# Patient Record
Sex: FEMALE | Race: NATIVE HAWAIIAN OR OTHER PACIFIC ISLANDER | HISPANIC OR LATINO | Employment: UNEMPLOYED | URBAN - METROPOLITAN AREA
[De-identification: names, ages, dates, MRNs, and addresses within clinical notes are randomized per-mention and may not be internally consistent; named-entity substitution may affect disease eponyms.]

---

## 2024-07-15 ENCOUNTER — ANCILLARY PROCEDURE (OUTPATIENT)
Dept: ULTRASOUND IMAGING | Facility: CLINIC | Age: 29
End: 2024-07-15
Attending: ADVANCED PRACTICE MIDWIFE
Payer: MEDICAID

## 2024-07-15 DIAGNOSIS — N91.2 AMENORRHEA: ICD-10-CM

## 2024-07-15 PROCEDURE — 76801 OB US < 14 WKS SINGLE FETUS: CPT | Mod: TC | Performed by: RADIOLOGY

## 2024-07-22 ENCOUNTER — LAB REQUISITION (OUTPATIENT)
Dept: LAB | Facility: CLINIC | Age: 29
End: 2024-07-22
Payer: MEDICAID

## 2024-07-22 DIAGNOSIS — O09.891 SUPERVISION OF OTHER HIGH RISK PREGNANCIES, FIRST TRIMESTER: ICD-10-CM

## 2024-07-22 DIAGNOSIS — O09.891 SUPERVISION OF OTHER HIGH RISK PREGNANCIES, FIRST TRIMESTER: Primary | ICD-10-CM

## 2024-07-22 DIAGNOSIS — Z98.891 HISTORY OF VAGINAL DELIVERY FOLLOWING PREVIOUS CESAREAN DELIVERY: ICD-10-CM

## 2024-07-22 LAB
ABO/RH(D): NORMAL
ANTIBODY SCREEN: NEGATIVE
HBV SURFACE AB SERPL IA-ACNC: <3.5 M[IU]/ML
HBV SURFACE AB SERPL IA-ACNC: NONREACTIVE M[IU]/ML
HBV SURFACE AG SERPL QL IA: NONREACTIVE
HCV AB SERPL QL IA: NONREACTIVE
HIV 1+2 AB+HIV1 P24 AG SERPL QL IA: NONREACTIVE
SPECIMEN EXPIRATION DATE: NORMAL
VIT D+METAB SERPL-MCNC: 19 NG/ML (ref 20–50)

## 2024-07-22 PROCEDURE — 87389 HIV-1 AG W/HIV-1&-2 AB AG IA: CPT | Mod: ORL | Performed by: MIDWIFE

## 2024-07-22 PROCEDURE — 86706 HEP B SURFACE ANTIBODY: CPT | Mod: ORL | Performed by: MIDWIFE

## 2024-07-22 PROCEDURE — 86787 VARICELLA-ZOSTER ANTIBODY: CPT | Performed by: MIDWIFE

## 2024-07-22 PROCEDURE — 86780 TREPONEMA PALLIDUM: CPT | Performed by: MIDWIFE

## 2024-07-22 PROCEDURE — 82306 VITAMIN D 25 HYDROXY: CPT | Mod: ORL | Performed by: MIDWIFE

## 2024-07-22 PROCEDURE — 86900 BLOOD TYPING SEROLOGIC ABO: CPT | Mod: ORL | Performed by: MIDWIFE

## 2024-07-22 PROCEDURE — 86780 TREPONEMA PALLIDUM: CPT | Mod: ORL | Performed by: MIDWIFE

## 2024-07-22 PROCEDURE — 87086 URINE CULTURE/COLONY COUNT: CPT | Mod: ORL | Performed by: MIDWIFE

## 2024-07-22 PROCEDURE — 87086 URINE CULTURE/COLONY COUNT: CPT | Performed by: MIDWIFE

## 2024-07-22 PROCEDURE — 86901 BLOOD TYPING SEROLOGIC RH(D): CPT | Mod: ORL | Performed by: MIDWIFE

## 2024-07-22 PROCEDURE — 86762 RUBELLA ANTIBODY: CPT | Mod: ORL | Performed by: MIDWIFE

## 2024-07-22 PROCEDURE — 86787 VARICELLA-ZOSTER ANTIBODY: CPT | Mod: ORL | Performed by: MIDWIFE

## 2024-07-22 PROCEDURE — 86803 HEPATITIS C AB TEST: CPT | Mod: ORL | Performed by: MIDWIFE

## 2024-07-22 PROCEDURE — 87340 HEPATITIS B SURFACE AG IA: CPT | Mod: ORL | Performed by: MIDWIFE

## 2024-07-22 PROCEDURE — 86762 RUBELLA ANTIBODY: CPT | Performed by: MIDWIFE

## 2024-07-23 ENCOUNTER — TRANSCRIBE ORDERS (OUTPATIENT)
Dept: MATERNAL FETAL MEDICINE | Facility: CLINIC | Age: 29
End: 2024-07-23
Payer: MEDICAID

## 2024-07-23 DIAGNOSIS — O26.90 PREGNANCY RELATED CONDITION, ANTEPARTUM: Primary | ICD-10-CM

## 2024-07-23 LAB
RUBV IGG SERPL QL IA: 1.05 INDEX
RUBV IGG SERPL QL IA: POSITIVE
T PALLIDUM AB SER QL: NONREACTIVE
VZV IGG SER QL IA: 694.1 INDEX
VZV IGG SER QL IA: POSITIVE

## 2024-07-24 LAB — BACTERIA UR CULT: NO GROWTH

## 2024-08-07 ENCOUNTER — APPOINTMENT (OUTPATIENT)
Dept: INTERPRETER SERVICES | Facility: CLINIC | Age: 29
End: 2024-08-07
Payer: MEDICAID

## 2024-08-19 ENCOUNTER — LAB REQUISITION (OUTPATIENT)
Dept: LAB | Facility: CLINIC | Age: 29
End: 2024-08-19
Payer: MEDICAID

## 2024-08-19 DIAGNOSIS — O09.891 SUPERVISION OF OTHER HIGH RISK PREGNANCIES, FIRST TRIMESTER: ICD-10-CM

## 2024-08-19 PROCEDURE — 87491 CHLMYD TRACH DNA AMP PROBE: CPT | Mod: ORL | Performed by: ADVANCED PRACTICE MIDWIFE

## 2024-08-19 PROCEDURE — 87591 N.GONORRHOEAE DNA AMP PROB: CPT | Mod: ORL | Performed by: ADVANCED PRACTICE MIDWIFE

## 2024-08-20 LAB
C TRACH DNA SPEC QL NAA+PROBE: NEGATIVE
N GONORRHOEA DNA SPEC QL NAA+PROBE: NEGATIVE

## 2024-09-03 ENCOUNTER — PRE VISIT (OUTPATIENT)
Dept: MATERNAL FETAL MEDICINE | Facility: CLINIC | Age: 29
End: 2024-09-03
Payer: MEDICAID

## 2024-09-16 ENCOUNTER — HOSPITAL ENCOUNTER (OUTPATIENT)
Dept: ULTRASOUND IMAGING | Facility: CLINIC | Age: 29
Discharge: HOME | End: 2024-09-16
Attending: STUDENT IN AN ORGANIZED HEALTH CARE EDUCATION/TRAINING PROGRAM
Payer: MEDICAID

## 2024-09-16 ENCOUNTER — TRANSCRIBE ORDERS (OUTPATIENT)
Dept: MATERNAL FETAL MEDICINE | Facility: CLINIC | Age: 29
End: 2024-09-16
Payer: MEDICAID

## 2024-09-16 ENCOUNTER — OFFICE VISIT (OUTPATIENT)
Dept: MATERNAL FETAL MEDICINE | Facility: CLINIC | Age: 29
End: 2024-09-16
Attending: MIDWIFE
Payer: MEDICAID

## 2024-09-16 DIAGNOSIS — O35.9XX0 SUSPECTED FETAL ANOMALY, ANTEPARTUM, SINGLE OR UNSPECIFIED FETUS: Primary | ICD-10-CM

## 2024-09-16 DIAGNOSIS — O26.90 PREGNANCY RELATED CONDITION, ANTEPARTUM: ICD-10-CM

## 2024-09-16 DIAGNOSIS — O26.90 PREGNANCY RELATED CONDITION, ANTEPARTUM: Primary | ICD-10-CM

## 2024-09-16 PROCEDURE — 76805 OB US >/= 14 WKS SNGL FETUS: CPT

## 2024-09-16 PROCEDURE — 99202 OFFICE O/P NEW SF 15 MIN: CPT | Mod: 25 | Performed by: STUDENT IN AN ORGANIZED HEALTH CARE EDUCATION/TRAINING PROGRAM

## 2024-09-16 PROCEDURE — 76805 OB US >/= 14 WKS SNGL FETUS: CPT | Mod: 26 | Performed by: STUDENT IN AN ORGANIZED HEALTH CARE EDUCATION/TRAINING PROGRAM

## 2024-09-16 NOTE — PROGRESS NOTES
The patient was seen for an ultrasound in the [facility] today.  For a detailed report of the ultrasound examination, please see the ultrasound report which can be found under the imaging tab.    If you have questions regarding today's evaluation or if we can be of further service, please contact the [facility].    Diego Simms M.D.  Maternal Fetal-Medicine Specialist

## 2024-09-16 NOTE — NURSING NOTE
Roomed patient with assistance of ipad . Patient reports positive fetal movement, no pain, denies contractions, leaking of fluid, or bleeding.   Education provided to patient on ultrasound.  SBAR given to BECKIE MONIQUE, see their note in Epic.

## 2024-11-04 ENCOUNTER — LAB REQUISITION (OUTPATIENT)
Dept: LAB | Facility: CLINIC | Age: 29
End: 2024-11-04
Payer: MEDICAID

## 2024-11-04 DIAGNOSIS — O09.92 SUPERVISION OF HIGH RISK PREGNANCY, UNSPECIFIED, SECOND TRIMESTER: ICD-10-CM

## 2024-11-04 LAB
C TRACH DNA SPEC QL NAA+PROBE: NEGATIVE
N GONORRHOEA DNA SPEC QL NAA+PROBE: NEGATIVE
VIT D+METAB SERPL-MCNC: 35 NG/ML (ref 20–50)

## 2024-11-04 PROCEDURE — 87491 CHLMYD TRACH DNA AMP PROBE: CPT | Mod: ORL | Performed by: MIDWIFE

## 2024-11-04 PROCEDURE — 87591 N.GONORRHOEAE DNA AMP PROB: CPT | Mod: ORL | Performed by: MIDWIFE

## 2024-11-04 PROCEDURE — 87491 CHLMYD TRACH DNA AMP PROBE: CPT | Performed by: MIDWIFE

## 2024-11-04 PROCEDURE — 86592 SYPHILIS TEST NON-TREP QUAL: CPT | Performed by: MIDWIFE

## 2024-11-04 PROCEDURE — 87591 N.GONORRHOEAE DNA AMP PROB: CPT | Performed by: MIDWIFE

## 2024-11-04 PROCEDURE — 86592 SYPHILIS TEST NON-TREP QUAL: CPT | Mod: ORL | Performed by: MIDWIFE

## 2024-11-04 PROCEDURE — 82306 VITAMIN D 25 HYDROXY: CPT | Performed by: MIDWIFE

## 2024-11-05 LAB — RPR SER QL: NONREACTIVE

## 2025-01-03 ENCOUNTER — LAB REQUISITION (OUTPATIENT)
Dept: LAB | Facility: CLINIC | Age: 30
End: 2025-01-03
Payer: COMMERCIAL

## 2025-01-03 DIAGNOSIS — O09.891 SUPERVISION OF OTHER HIGH RISK PREGNANCIES, FIRST TRIMESTER: ICD-10-CM

## 2025-01-03 PROCEDURE — 87081 CULTURE SCREEN ONLY: CPT | Mod: ORL | Performed by: MIDWIFE

## 2025-01-07 LAB — BACTERIA SPEC CULT: NORMAL

## 2025-01-24 PROBLEM — Z98.891 HISTORY OF CESAREAN DELIVERY: Status: ACTIVE | Noted: 2025-01-24

## 2025-01-24 PROBLEM — O99.013 ANEMIA DURING PREGNANCY IN THIRD TRIMESTER: Status: ACTIVE | Noted: 2025-01-24

## 2025-01-24 PROBLEM — E55.9 VITAMIN D DEFICIENCY: Status: ACTIVE | Noted: 2024-07-24

## 2025-01-24 PROBLEM — O09.891 SUPERVISION OF OTHER HIGH RISK PREGNANCIES, FIRST TRIMESTER: Status: ACTIVE | Noted: 2024-07-22

## 2025-01-28 ENCOUNTER — ANESTHESIA EVENT (OUTPATIENT)
Dept: OBGYN | Facility: CLINIC | Age: 30
End: 2025-01-28
Payer: COMMERCIAL

## 2025-01-28 ENCOUNTER — APPOINTMENT (OUTPATIENT)
Dept: INTERPRETER SERVICES | Facility: CLINIC | Age: 30
End: 2025-01-28
Payer: COMMERCIAL

## 2025-01-28 ENCOUNTER — OFFICE VISIT (OUTPATIENT)
Dept: INTERPRETER SERVICES | Facility: CLINIC | Age: 30
End: 2025-01-28
Payer: COMMERCIAL

## 2025-01-28 ENCOUNTER — ANESTHESIA (OUTPATIENT)
Dept: OBGYN | Facility: CLINIC | Age: 30
End: 2025-01-28
Payer: COMMERCIAL

## 2025-01-28 ENCOUNTER — HOSPITAL ENCOUNTER (INPATIENT)
Facility: CLINIC | Age: 30
End: 2025-01-28
Attending: ADVANCED PRACTICE MIDWIFE | Admitting: ADVANCED PRACTICE MIDWIFE
Payer: COMMERCIAL

## 2025-01-28 ENCOUNTER — VIRTUAL VISIT (OUTPATIENT)
Dept: INTERPRETER SERVICES | Facility: CLINIC | Age: 30
End: 2025-01-28
Payer: COMMERCIAL

## 2025-01-28 DIAGNOSIS — Z98.891 S/P CESAREAN SECTION: ICD-10-CM

## 2025-01-28 PROBLEM — O13.3 GESTATIONAL HYPERTENSION, THIRD TRIMESTER: Status: ACTIVE | Noted: 2025-01-28

## 2025-01-28 PROBLEM — D50.8 IRON DEFICIENCY ANEMIA SECONDARY TO INADEQUATE DIETARY IRON INTAKE: Status: ACTIVE | Noted: 2025-01-09

## 2025-01-28 PROBLEM — O09.90 HIGH-RISK PREGNANCY: Status: ACTIVE | Noted: 2024-07-22

## 2025-01-28 PROBLEM — Z34.90 ENCOUNTER FOR INDUCTION OF LABOR: Status: ACTIVE | Noted: 2025-01-28

## 2025-01-28 LAB
ABO + RH BLD: NORMAL
ALBUMIN MFR UR ELPH: 55.5 MG/DL
ALBUMIN SERPL BCG-MCNC: 3.3 G/DL (ref 3.5–5.2)
ALP SERPL-CCNC: 296 U/L (ref 40–150)
ALT SERPL W P-5'-P-CCNC: 26 U/L (ref 0–50)
ANION GAP SERPL CALCULATED.3IONS-SCNC: 11 MMOL/L (ref 7–15)
AST SERPL W P-5'-P-CCNC: 36 U/L (ref 0–45)
BILIRUB SERPL-MCNC: 0.6 MG/DL
BLD GP AB SCN SERPL QL: NEGATIVE
BUN SERPL-MCNC: 9.2 MG/DL (ref 6–20)
CALCIUM SERPL-MCNC: 8.7 MG/DL (ref 8.8–10.4)
CHLORIDE SERPL-SCNC: 103 MMOL/L (ref 98–107)
CREAT SERPL-MCNC: 0.56 MG/DL (ref 0.51–0.95)
CREAT UR-MCNC: 231.5 MG/DL
EGFRCR SERPLBLD CKD-EPI 2021: >90 ML/MIN/1.73M2
ERYTHROCYTE [DISTWIDTH] IN BLOOD BY AUTOMATED COUNT: 15.2 % (ref 10–15)
GLUCOSE SERPL-MCNC: 85 MG/DL (ref 70–99)
HCO3 SERPL-SCNC: 20 MMOL/L (ref 22–29)
HCT VFR BLD AUTO: 30.9 % (ref 35–47)
HGB BLD-MCNC: 10.8 G/DL (ref 11.7–15.7)
MCH RBC QN AUTO: 30.4 PG (ref 26.5–33)
MCHC RBC AUTO-ENTMCNC: 35 G/DL (ref 31.5–36.5)
MCV RBC AUTO: 87 FL (ref 78–100)
PLATELET # BLD AUTO: 158 10E3/UL (ref 150–450)
POTASSIUM SERPL-SCNC: 4 MMOL/L (ref 3.4–5.3)
PROT SERPL-MCNC: 6.1 G/DL (ref 6.4–8.3)
PROT/CREAT 24H UR: 0.24 MG/MG CR (ref 0–0.2)
RBC # BLD AUTO: 3.55 10E6/UL (ref 3.8–5.2)
SODIUM SERPL-SCNC: 134 MMOL/L (ref 135–145)
SPECIMEN EXP DATE BLD: NORMAL
T PALLIDUM AB SER QL: NONREACTIVE
WBC # BLD AUTO: 4.2 10E3/UL (ref 4–11)

## 2025-01-28 PROCEDURE — 80053 COMPREHEN METABOLIC PANEL: CPT | Performed by: ADVANCED PRACTICE MIDWIFE

## 2025-01-28 PROCEDURE — 258N000003 HC RX IP 258 OP 636: Performed by: ADVANCED PRACTICE MIDWIFE

## 2025-01-28 PROCEDURE — 86923 COMPATIBILITY TEST ELECTRIC: CPT

## 2025-01-28 PROCEDURE — 250N000009 HC RX 250: Performed by: ADVANCED PRACTICE MIDWIFE

## 2025-01-28 PROCEDURE — 00HU33Z INSERTION OF INFUSION DEVICE INTO SPINAL CANAL, PERCUTANEOUS APPROACH: ICD-10-PCS | Performed by: ANESTHESIOLOGY

## 2025-01-28 PROCEDURE — T1013 SIGN LANG/ORAL INTERPRETER: HCPCS | Mod: GT,TEL,95

## 2025-01-28 PROCEDURE — 120N000002 HC R&B MED SURG/OB UMMC

## 2025-01-28 PROCEDURE — 85027 COMPLETE CBC AUTOMATED: CPT | Performed by: ADVANCED PRACTICE MIDWIFE

## 2025-01-28 PROCEDURE — 3E0R3BZ INTRODUCTION OF ANESTHETIC AGENT INTO SPINAL CANAL, PERCUTANEOUS APPROACH: ICD-10-PCS | Performed by: ANESTHESIOLOGY

## 2025-01-28 PROCEDURE — 86850 RBC ANTIBODY SCREEN: CPT | Performed by: ADVANCED PRACTICE MIDWIFE

## 2025-01-28 PROCEDURE — 86780 TREPONEMA PALLIDUM: CPT | Performed by: ADVANCED PRACTICE MIDWIFE

## 2025-01-28 PROCEDURE — 86900 BLOOD TYPING SEROLOGIC ABO: CPT | Performed by: ADVANCED PRACTICE MIDWIFE

## 2025-01-28 PROCEDURE — T1013 SIGN LANG/ORAL INTERPRETER: HCPCS

## 2025-01-28 PROCEDURE — 84156 ASSAY OF PROTEIN URINE: CPT | Performed by: ADVANCED PRACTICE MIDWIFE

## 2025-01-28 RX ORDER — PROCHLORPERAZINE MALEATE 10 MG
10 TABLET ORAL EVERY 6 HOURS PRN
Status: DISCONTINUED | OUTPATIENT
Start: 2025-01-28 | End: 2025-01-29 | Stop reason: HOSPADM

## 2025-01-28 RX ORDER — ACETAMINOPHEN 325 MG/1
650 TABLET ORAL EVERY 4 HOURS PRN
Status: DISCONTINUED | OUTPATIENT
Start: 2025-01-28 | End: 2025-01-29 | Stop reason: HOSPADM

## 2025-01-28 RX ORDER — CITRIC ACID/SODIUM CITRATE 334-500MG
30 SOLUTION, ORAL ORAL
Status: DISCONTINUED | OUTPATIENT
Start: 2025-01-28 | End: 2025-01-29 | Stop reason: HOSPADM

## 2025-01-28 RX ORDER — KETOROLAC TROMETHAMINE 30 MG/ML
30 INJECTION, SOLUTION INTRAMUSCULAR; INTRAVENOUS
Status: DISCONTINUED | OUTPATIENT
Start: 2025-01-28 | End: 2025-01-29

## 2025-01-28 RX ORDER — FENTANYL CITRATE 50 UG/ML
100 INJECTION, SOLUTION INTRAMUSCULAR; INTRAVENOUS
Status: DISCONTINUED | OUTPATIENT
Start: 2025-01-28 | End: 2025-01-29 | Stop reason: HOSPADM

## 2025-01-28 RX ORDER — LOPERAMIDE HYDROCHLORIDE 2 MG/1
2 CAPSULE ORAL
Status: DISCONTINUED | OUTPATIENT
Start: 2025-01-28 | End: 2025-01-29 | Stop reason: HOSPADM

## 2025-01-28 RX ORDER — SODIUM CHLORIDE, SODIUM LACTATE, POTASSIUM CHLORIDE, CALCIUM CHLORIDE 600; 310; 30; 20 MG/100ML; MG/100ML; MG/100ML; MG/100ML
10-125 INJECTION, SOLUTION INTRAVENOUS CONTINUOUS
Status: DISCONTINUED | OUTPATIENT
Start: 2025-01-28 | End: 2025-01-29

## 2025-01-28 RX ORDER — HYDRALAZINE HYDROCHLORIDE 20 MG/ML
10 INJECTION INTRAMUSCULAR; INTRAVENOUS
Status: DISCONTINUED | OUTPATIENT
Start: 2025-01-28 | End: 2025-01-29

## 2025-01-28 RX ORDER — VITAMIN A, VITAMIN C, VITAMIN D-3, VITAMIN E, VITAMIN B-1, VITAMIN B-2, NIACIN, VITAMIN B-6, CALCIUM, IRON, ZINC, COPPER 4000; 120; 400; 22; 1.84; 3; 20; 10; 1; 12; 200; 27; 25; 2 [IU]/1; MG/1; [IU]/1; MG/1; MG/1; MG/1; MG/1; MG/1; MG/1; UG/1; MG/1; MG/1; MG/1; MG/1
1 TABLET ORAL DAILY
Status: ON HOLD | COMMUNITY

## 2025-01-28 RX ORDER — LIDOCAINE 40 MG/G
CREAM TOPICAL
Status: DISCONTINUED | OUTPATIENT
Start: 2025-01-28 | End: 2025-01-29

## 2025-01-28 RX ORDER — LIDOCAINE 40 MG/G
CREAM TOPICAL
Status: DISCONTINUED | OUTPATIENT
Start: 2025-01-28 | End: 2025-01-29 | Stop reason: HOSPADM

## 2025-01-28 RX ORDER — LABETALOL HYDROCHLORIDE 5 MG/ML
20-80 INJECTION, SOLUTION INTRAVENOUS EVERY 10 MIN PRN
Status: DISCONTINUED | OUTPATIENT
Start: 2025-01-28 | End: 2025-01-29

## 2025-01-28 RX ORDER — METOCLOPRAMIDE 10 MG/1
10 TABLET ORAL EVERY 6 HOURS PRN
Status: DISCONTINUED | OUTPATIENT
Start: 2025-01-28 | End: 2025-01-29 | Stop reason: HOSPADM

## 2025-01-28 RX ORDER — HYDRALAZINE HYDROCHLORIDE 20 MG/ML
10 INJECTION INTRAMUSCULAR; INTRAVENOUS
Status: ACTIVE | OUTPATIENT
Start: 2025-01-28

## 2025-01-28 RX ORDER — NALOXONE HYDROCHLORIDE 0.4 MG/ML
0.2 INJECTION, SOLUTION INTRAMUSCULAR; INTRAVENOUS; SUBCUTANEOUS
Status: DISCONTINUED | OUTPATIENT
Start: 2025-01-28 | End: 2025-01-29 | Stop reason: HOSPADM

## 2025-01-28 RX ORDER — OXYTOCIN/0.9 % SODIUM CHLORIDE 30/500 ML
100-340 PLASTIC BAG, INJECTION (ML) INTRAVENOUS CONTINUOUS PRN
Status: DISCONTINUED | OUTPATIENT
Start: 2025-01-28 | End: 2025-01-29

## 2025-01-28 RX ORDER — IBUPROFEN 800 MG/1
800 TABLET, FILM COATED ORAL
Status: DISCONTINUED | OUTPATIENT
Start: 2025-01-28 | End: 2025-01-29

## 2025-01-28 RX ORDER — OXYTOCIN 10 [USP'U]/ML
10 INJECTION, SOLUTION INTRAMUSCULAR; INTRAVENOUS
Status: DISCONTINUED | OUTPATIENT
Start: 2025-01-28 | End: 2025-01-29

## 2025-01-28 RX ORDER — SODIUM CHLORIDE, SODIUM LACTATE, POTASSIUM CHLORIDE, CALCIUM CHLORIDE 600; 310; 30; 20 MG/100ML; MG/100ML; MG/100ML; MG/100ML
10-125 INJECTION, SOLUTION INTRAVENOUS CONTINUOUS
Status: ACTIVE | OUTPATIENT
Start: 2025-01-28

## 2025-01-28 RX ORDER — MISOPROSTOL 200 UG/1
800 TABLET ORAL
Status: DISCONTINUED | OUTPATIENT
Start: 2025-01-28 | End: 2025-01-29 | Stop reason: HOSPADM

## 2025-01-28 RX ORDER — METOCLOPRAMIDE HYDROCHLORIDE 5 MG/ML
10 INJECTION INTRAMUSCULAR; INTRAVENOUS EVERY 6 HOURS PRN
Status: DISCONTINUED | OUTPATIENT
Start: 2025-01-28 | End: 2025-01-29 | Stop reason: HOSPADM

## 2025-01-28 RX ORDER — LOPERAMIDE HYDROCHLORIDE 2 MG/1
4 CAPSULE ORAL
Status: DISCONTINUED | OUTPATIENT
Start: 2025-01-28 | End: 2025-01-29 | Stop reason: HOSPADM

## 2025-01-28 RX ORDER — ONDANSETRON 2 MG/ML
4 INJECTION INTRAMUSCULAR; INTRAVENOUS EVERY 6 HOURS PRN
Status: DISCONTINUED | OUTPATIENT
Start: 2025-01-28 | End: 2025-01-29 | Stop reason: HOSPADM

## 2025-01-28 RX ORDER — MORPHINE SULFATE 10 MG/ML
10 INJECTION, SOLUTION INTRAMUSCULAR; INTRAVENOUS
Status: DISCONTINUED | OUTPATIENT
Start: 2025-01-28 | End: 2025-01-29 | Stop reason: HOSPADM

## 2025-01-28 RX ORDER — SODIUM CHLORIDE, SODIUM LACTATE, POTASSIUM CHLORIDE, CALCIUM CHLORIDE 600; 310; 30; 20 MG/100ML; MG/100ML; MG/100ML; MG/100ML
INJECTION, SOLUTION INTRAVENOUS CONTINUOUS PRN
Status: DISCONTINUED | OUTPATIENT
Start: 2025-01-28 | End: 2025-01-29 | Stop reason: HOSPADM

## 2025-01-28 RX ORDER — OXYTOCIN 10 [USP'U]/ML
10 INJECTION, SOLUTION INTRAMUSCULAR; INTRAVENOUS
Status: DISCONTINUED | OUTPATIENT
Start: 2025-01-28 | End: 2025-01-29 | Stop reason: HOSPADM

## 2025-01-28 RX ORDER — TRANEXAMIC ACID 10 MG/ML
1 INJECTION, SOLUTION INTRAVENOUS EVERY 30 MIN PRN
Status: DISCONTINUED | OUTPATIENT
Start: 2025-01-28 | End: 2025-01-29 | Stop reason: HOSPADM

## 2025-01-28 RX ORDER — ONDANSETRON 4 MG/1
4 TABLET, ORALLY DISINTEGRATING ORAL EVERY 6 HOURS PRN
Status: DISCONTINUED | OUTPATIENT
Start: 2025-01-28 | End: 2025-01-29 | Stop reason: HOSPADM

## 2025-01-28 RX ORDER — CARBOPROST TROMETHAMINE 250 UG/ML
250 INJECTION, SOLUTION INTRAMUSCULAR
Status: DISCONTINUED | OUTPATIENT
Start: 2025-01-28 | End: 2025-01-29 | Stop reason: HOSPADM

## 2025-01-28 RX ORDER — NALOXONE HYDROCHLORIDE 0.4 MG/ML
0.4 INJECTION, SOLUTION INTRAMUSCULAR; INTRAVENOUS; SUBCUTANEOUS
Status: DISCONTINUED | OUTPATIENT
Start: 2025-01-28 | End: 2025-01-29 | Stop reason: HOSPADM

## 2025-01-28 RX ORDER — MISOPROSTOL 200 UG/1
400 TABLET ORAL
Status: DISCONTINUED | OUTPATIENT
Start: 2025-01-28 | End: 2025-01-29 | Stop reason: HOSPADM

## 2025-01-28 RX ORDER — LABETALOL HYDROCHLORIDE 5 MG/ML
20-80 INJECTION, SOLUTION INTRAVENOUS EVERY 10 MIN PRN
Status: ACTIVE | OUTPATIENT
Start: 2025-01-28

## 2025-01-28 RX ORDER — METHYLERGONOVINE MALEATE 0.2 MG/ML
200 INJECTION INTRAVENOUS
Status: DISCONTINUED | OUTPATIENT
Start: 2025-01-28 | End: 2025-01-29 | Stop reason: HOSPADM

## 2025-01-28 RX ORDER — FERROUS GLUCONATE 324(38)MG
324 TABLET ORAL
Status: ON HOLD | COMMUNITY

## 2025-01-28 RX ORDER — MISOPROSTOL 100 UG/1
25 TABLET ORAL EVERY 4 HOURS PRN
Status: DISCONTINUED | OUTPATIENT
Start: 2025-01-28 | End: 2025-01-28

## 2025-01-28 RX ORDER — OXYTOCIN/0.9 % SODIUM CHLORIDE 30/500 ML
1-24 PLASTIC BAG, INJECTION (ML) INTRAVENOUS CONTINUOUS
Status: DISCONTINUED | OUTPATIENT
Start: 2025-01-28 | End: 2025-01-29 | Stop reason: HOSPADM

## 2025-01-28 RX ORDER — OXYTOCIN/0.9 % SODIUM CHLORIDE 30/500 ML
340 PLASTIC BAG, INJECTION (ML) INTRAVENOUS CONTINUOUS PRN
Status: DISCONTINUED | OUTPATIENT
Start: 2025-01-28 | End: 2025-01-29 | Stop reason: HOSPADM

## 2025-01-28 RX ADMIN — SODIUM CHLORIDE, POTASSIUM CHLORIDE, SODIUM LACTATE AND CALCIUM CHLORIDE 50 ML/HR: 600; 310; 30; 20 INJECTION, SOLUTION INTRAVENOUS at 14:32

## 2025-01-28 RX ADMIN — Medication 2 MILLI-UNITS/MIN: at 14:32

## 2025-01-28 ASSESSMENT — ACTIVITIES OF DAILY LIVING (ADL)
ADLS_ACUITY_SCORE: 20
ADLS_ACUITY_SCORE: 15

## 2025-01-28 NOTE — H&P
"ADMIT NOTE  =================  41w0d    Deisi Carl is a 29 year old female with an Patient's last menstrual period was 2024. and Estimated Date of Delivery: 2025 is [facility] on 2025 at 9:41 AM with for induction of labor.  Indication: prolonged pregnancy.     HPI  ================  Deisi has received her care at the [facility] clinic. She has a history of CS in [facility] in . She was told the indication was that they saw a nuchal cord on US and recommended a CS. She did not have an opportunity to labor.   She opted for an IOL when she reached 41 weeks  Pregnancy has been significant for anemia with a recent Hgb of 10.0, Hx of CS, vitamin D deficiency, Hep B NI s/p 3 doses    Contractions- none  Fetal movement- active  ROM- no   Vaginal bleeding- none  GBS- negative  FOB- is involved, Marvin  Other labor support- NA    Weight gain- 158 - 123 lbs, Total weight gain- 35 lbs  Height- 5'0\"  BMI- 23  First prenatal visit at 13 weeks, Total visits- 12    PROBLEM LIST  =================  Patient Active Problem List    Diagnosis Date Noted    Encounter for induction of labor 2025     Priority: Medium    History of  delivery planning TOLAC 2025     Priority: Medium    Anemia during pregnancy in third trimester 2025     Priority: Medium     1/3/25 Hgb 10.0      Iron deficiency anemia secondary to inadequate dietary iron intake 2025     Priority: Medium    Vitamin D deficiency 2024     Priority: Medium    High-risk pregnancy 2024     Priority: Medium     [facility] CNM    Partner's name:     [x]Entered on [facility] prenatal list    [x]Applied for insurance    [x]NOB folder    [x]Dating    [x]Patient declines 1st tri genetic screening    [x]Fetal anatomy US ordered    [x]recommended LD ASA after 12 wks for PRE-E risk    [x]No increased risk for GDM  pending A1C     [x]No need for utox in labor    []COVID vaccine completed    []Pap plan " postpartum         12-23wks________________________    [x]Rubella immune    []Hep B not immune     [x]Varicella immune    [x]declines AFP/QS    []FLU shot         24-28wk_________________________    []EOB folder    []Labor plans:    []:    []Car seat:    []Breast pump:    []Infant feeding plan:     []Infant pediatrician:    []PP Contraception plan: If tubal,consent date:    []TDAP     []Rhogam if needed, date:         29-35 wk________________________    []TOLAC consent done    []Water birth interest    []GCT    []RSV         36-37 wks______________________     []GBS     []OTC PP meds sent    []PP recovery plans:         38-42 wks______________________    []IOL reason/plans    []Postdates BPP    [facility]  Partner's name: Marvin  [x]Entered on [facility] prenatal list  [x]Applied for insurance  [x]NOB folder  [x]Dating  [x]Patient declines 1st tri genetic screening  [x]Fetal anatomy US ordered  [x]recommended LD ASA after 12 wks for PRE-E risk  [x]No increased risk for GDM  pending A1C   [x]No need for utox in labor  []COVID vaccine completed  []Pap plan postpartum    12-23wks________________________  [x]Rubella immune  [x]Hep B not immune  #1 8/19  #2 9/16  #3 1/3 done  [x]Varicella immune  [x]declines AFP/QS  [x]FLU shot declines    24-28wk_________________________  [x]EOB folder  [x]Labor plans: TOLAC  [x]: not interested   [x]Car seat: Has  [x]Breast pump: Has  [x]Infant feeding plan: breast/bottle  [x]Infant pediatrician: BRIELLE  [x]PP Contraception plan: Nexplanon  [x]TDAP   NA- Rhogam if needed, date:    29-35 wk________________________  [x]TOLAC consent done  [x]Water birth interest NA  [x]GCT  []RSV    36-37 wks______________________   [x]GBS negative  [x]OTC PP meds sent  [x]PP recovery plans: PP recovery plans: will be able to be home with the baby for 3 months, Marvin will be home 2-3 weeks. Marvin has a brother here and Deisi has a sister her who will be supportive as well    38-42  wks______________________  [x]IOL reason/plans: prolonged pregnancy 25 at 7:30  []Postdates BPP         HISTORIES  ============  No Known Allergies  History reviewed. No pertinent past medical history.  Past Surgical History:   Procedure Laterality Date    GYN SURGERY     .  History reviewed. No pertinent family history.  Social History     Tobacco Use    Smoking status: Never    Smokeless tobacco: Never   Substance Use Topics    Alcohol use: Never     OB History    Para Term  AB Living   3 2 2 0 0 2   SAB IAB Ectopic Multiple Live Births   0 0 0 0 2      # Outcome Date GA Lbr Rj/2nd Weight Sex Type Anes PTL Lv   3 Current            2 Term 14 41w0d  3.175 kg (7 lb) M CS-LTranv   REAGAN      Birth Comments: PLTCS for fetal distress      Name: Harsh   1 Term         REAGAN      Obstetric Comments   Previous C/S        LABS:   ===========  Prenatal Labs:  Rhogam not indicated   Lab Results   Component Value Date    AS Negative 2024    HEPBANG Nonreactive 2024    HGB 10.8 (L) 2025     Rubella immune  GBS neg  Other labs:  Results for orders placed or performed during the hospital encounter of 25 (from the past 24 hours)   CBC with platelets   Result Value Ref Range    WBC Count 4.2 4.0 - 11.0 10e3/uL    RBC Count 3.55 (L) 3.80 - 5.20 10e6/uL    Hemoglobin 10.8 (L) 11.7 - 15.7 g/dL    Hematocrit 30.9 (L) 35.0 - 47.0 %    MCV 87 78 - 100 fL    MCH 30.4 26.5 - 33.0 pg    MCHC 35.0 31.5 - 36.5 g/dL    RDW 15.2 (H) 10.0 - 15.0 %    Platelet Count 158 150 - 450 10e3/uL   ABO/Rh type and screen    Narrative    The following orders were created for panel order ABO/Rh type and screen.  Procedure                               Abnormality         Status                     ---------                               -----------         ------                     Adult Type and Screen[[id number]]                            Preliminary result           Please view results for these tests on  "the individual orders.   Adult Type and Screen   Result Value Ref Range    SPECIMEN EXPIRATION DATE [id number]        ROS  =========  Pt denies significant respiratory, cardiovacular, GI, or muscular/skeletalcomplaints.    See RN data base ROS.       PHYSICAL EXAM:  ===============  /65 (Patient Position: Semi-Lemos's, Cuff Size: Adult Regular)   Temp 98.6  F (37  C) (Oral)   Resp 16   Ht 1.55 m (5' 1.02\")   Wt 71.7 kg (158 lb)   LMP 2024   BMI 29.83 kg/m    General appearance: comfortable  GENERAL APPEARANCE: healthy, alert and no distress  RESP: lungs clear to auscultation - no rales, rhonchi or wheezes  CV: regular rates and rhythm, normal S1 S2, no S3 or S4 and no murmur,and no varicosities  ABDOMEN:  soft, nontender, no epigastric pain  SKIN: no suspicious lesions or rashes  NEURO: Denies headache, blurred vision, other vision changes  PSYCH: mentation appears normal. and affect normal/bright  Legs: Reflexes normal bilaterally     Abdomen: gravid, vertex fetus per Leopold's, non-tender between contractions.   Cephalic presentation confirmed by BSUS - LOT  EFW-  7.5 lbs.   CONTACTIONS: none  FETAL HEART TONES: continuous EFM- baseline 145 with moderate variability and positive accelerations. No decelerations.  PELVIC EXAM: 1/ 30%/ Posterior/ average/ -3   YANEZ SCORE: 3  COOK placed internal balloon infused to 80mL  BLOODY SHOW: no   ROM:no  FLUID: none  ROMPlus: not done    ASSESSMENT:  ==============  IUP @ 41w0d admitted for induction of labor.  Indication: prolonged pregnancy   Fetal Heart Rate - category one  GBS- negative    Patient Active Problem List   Diagnosis    High-risk pregnancy    Vitamin D deficiency    History of  delivery planning TOLAC    Anemia during pregnancy in third trimester    Encounter for induction of labor    Iron deficiency anemia secondary to inadequate dietary iron intake        PLAN:  ===========  -Reviewed options for IOL, Deisi accepts COOK " catheter placement after review of R/B/A  -Plan to start pitocin once the her cervix is 4cm or with expulsion of COOK  -Admit - see IP orders  -Pain medication options of nitrous oxide, fentanyl IV and epidural anesthesia reviewed with pt. Pt is interested in unmedicated birth at this point may consider  cervical ripening with Cook catheter risks and benefits reviewed with pt. Agreeable to plan.  -Dr. Vargas aware of pt's desire to TOLAC    Medically Ready for Discharge: Anticipated in 2-4 Days      Irma Yoon CNM

## 2025-01-28 NOTE — PROVIDER NOTIFICATION
25 0840   Provider Notification   Provider Name/Title Irma Colmenares   Method of Notification Electronic Page   Request Evaluate in Person   Notification Reason Patient Arrived;Status Update     Provider notified via web based paging. Patient has arrived for IOL for TOLAC.  41 weeks. Denies vaginal bleeding or leakage of fluid. Denies pain or contractions. She had one mild range BP. Has +2 pitting edema to bilateral feet. Denies other s/s of preeclampsia. Baby is on continuous fetal monitoring.

## 2025-01-28 NOTE — PROGRESS NOTES
SVE at 1408 was 3/80/-3/posterior/medium with titus score 6. Pitocin started at 2 units at 1442. COOK balloon still intact.

## 2025-01-28 NOTE — ANESTHESIA PREPROCEDURE EVALUATION
"Anesthesia Pre-Procedure Evaluation    Patient: Deiis Jeter [place]   MRN: [id number] : 1995        Procedure :           History reviewed. No pertinent past medical history.   Past Surgical History:   Procedure Laterality Date     GYN SURGERY        No Known Allergies   Social History     Tobacco Use     Smoking status: Never     Smokeless tobacco: Never   Substance Use Topics     Alcohol use: Never      Wt Readings from Last 1 Encounters:   25 71.7 kg (158 lb)        Anesthesia Evaluation   Pt has had prior anesthetic. Type: Regional.    No history of anesthetic complications       ROS/MED HX  ENT/Pulmonary:  - neg pulmonary ROS     Neurologic:  - neg neurologic ROS     Cardiovascular:     (+)  hypertension- -   -  - -                                      METS/Exercise Tolerance:     Hematologic:     (+)      anemia,          Musculoskeletal:  - neg musculoskeletal ROS     GI/Hepatic:     (+) GERD, Other,                  Renal/Genitourinary:       Endo:  - neg endo ROS     Psychiatric/Substance Use:  - neg psychiatric ROS     Infectious Disease:       Malignancy:  - neg malignancy ROS     Other:      (+)  , ,previous , TOLAC candidate         Physical Exam    Airway        Mallampati: III   TM distance: > 3 FB   Neck ROM: full   Mouth opening: > 3 cm    Respiratory Devices and Support         Dental       (+) Modest Abnormalities - crowns, retainers, 1 or 2 missing teeth      Cardiovascular   cardiovascular exam normal       Rhythm and rate: regular and normal     Pulmonary   pulmonary exam normal        breath sounds clear to auscultation         OUTSIDE LABS:  CBC: No results found for: \"WBC\", \"HGB\", \"HCT\", \"PLT\"  BMP: No results found for: \"NA\", \"POTASSIUM\", \"CHLORIDE\", \"CO2\", \"BUN\", \"CR\", \"GLC\"  COAGS: No results found for: \"PTT\", \"INR\", \"FIBR\"  POC: No results found for: \"BGM\", \"HCG\", \"HCGS\"  HEPATIC: No results found for: \"ALBUMIN\", \"PROTTOTAL\", \"ALT\", \"AST\", \"GGT\", \"ALKPHOS\", " "\"BILITOTAL\", \"BILIDIRECT\", \"ALLAN\"  OTHER: No results found for: \"PH\", \"LACT\", \"A1C\", \"SUDHEER\", \"PHOS\", \"MAG\", \"LIPASE\", \"AMYLASE\", \"TSH\", \"T4\", \"T3\", \"CRP\", \"SED\"    Anesthesia Plan    ASA Status:  2    NPO Status:  ELEVATED Aspiration Risk/Unknown    Anesthesia Type: Epidural.              Consents    Anesthesia Plan(s) and associated risks, benefits, and realistic alternatives discussed. Questions answered and patient/representative(s) expressed understanding.     - Discussed:     - Discussed with:  Patient            Postoperative Care    Pain management: Multi-modal analgesia.        Comments:           neg OB ROS.      Shantal Johnson MD    I have reviewed the pertinent notes and labs in the chart from the past 30 days and (re)examined the patient.  Any updates or changes from those notes are reflected in this note.    Clinically Significant Risk Factors Present on Admission                                          "

## 2025-01-28 NOTE — PROGRESS NOTES
Data: Patient admitted to room 462 at 0730. Patient is a . Prenatal record reviewed.   OB History    Para Term  AB Living   3 2 2 0 0 2   SAB IAB Ectopic Multiple Live Births   0 0 0 0 2      # Outcome Date GA Lbr Rj/2nd Weight Sex Type Anes PTL Lv   3 Current            2 Term 14 41w0d  3.175 kg (7 lb) M CS-LTranv   REAGAN      Birth Comments: PLTCS for fetal distress      Name: Harsh Francis Term         REAGAN      Obstetric Comments   Previous C/S   .  Medical History: History reviewed. No pertinent past medical history..  Gestational age 41w0d. Vital signs per doc flowsheet. Fetal movement present. Patient reports Induction Of Labor   as reason for admission. Support persons  Marvin present.  Action: In-person  present. Verbal consent for EFM, external fetal monitors applied. Admission assessment completed. Patient and support persons educated on labor process. Patient instructed to report change in fetal movement, contractions, vaginal leaking of fluid or bleeding, abdominal pain, or any concerns related to the pregnancy to her nurse/physician. Patient oriented to room, call light in reach.   Response: DOUG Colmenares informed of patient arrival and patient status. SVE /-3 at 0928. Plan per provider is COOK catheter with single balloon. Patient verbalized understanding of education and verbalized agreement with plan. COOK balloon placed at 0930 with 80 ml uterine inflation. Patient tolerated the procedure well. Patient coping with labor via support person.

## 2025-01-28 NOTE — PROGRESS NOTES
Dylan had anesthesia consultation. She is informed and educated about the pain management options during labor. She consented for epidural, in case she decides to choose it during active stage of labor. In-person  Marcella Vallecillo with ID number [id number] was present during consultation and signing of consent.

## 2025-01-28 NOTE — PROGRESS NOTES
"Labor Progress Note     SUBJECTIVE:  ==============  Deisi Jeter [place]  Estimated Date of Delivery: 2025  Deisi is sitting up in throne position. She has had an opportunity to walk and sit on the labor ball. Accepts an SVE at this time.   Discussed diagnosis of gestational hypertension now that she has had two mild range blood pressures >4 hours apart    General appearance: comfortable  Support: Marvin      OBJECTIVE:  ==============  VITALS  Blood pressure (!) 141/79, temperature 97.6  F (36.4  C), temperature source Oral, resp. rate 16, height 1.55 m (5' 1.02\"), weight 71.7 kg (158 lb), last menstrual period 2024.  Patient Vitals for the past 24 hrs:   BP Temp Temp src Resp Height Weight   25 1250 (!) 141/79 97.6  F (36.4  C) Oral 16 -- --   25 0830 120/65 -- -- -- -- --   25 0815 129/71 -- -- -- -- --   25 0800 127/73 98.6  F (37  C) Oral 16 -- --   25 0752 -- -- -- -- 1.55 m (5' 1.02\") 71.7 kg (158 lb)   25 0745 (!) 143/83 -- -- -- -- --       FETAL HEART RATE ASSESSMENT:  Baseline rate 140, normal  Variability moderate  Accelerations present   Decelerations not present       CONTRACTIONS: Contractions every 5 minutes.  Palpate: moderate  Pitocin- none,  Antibiotics- none    ROM: not ruptured  PELVIC EXAM: PELVIC EXAM: 3/ 80%/ Posterior/ average/ -3   Bravo 6  COOK still in place    # Pain Assessment:      2025     1:30 PM   Current Pain Score   Patient currently in pain? yes   - Deisi is experiencing pain due to contractions. Pain management was discussed and the plan was created in a collaborative fashion.  Deisi's response to the current recommendations: engaged  - would like to try walking, sitting on the ball      FETAL HEART RATE ASSESSMENT:  Reviewed fetal monitoring strip at bedside  EFM interpretation suggests: absence of concern for metabolic acidemia due to: moderate variability. EFM suggests no concern for interruption of the oxygen " pathway..        Labor course:  25  0745 143/83  0930 COOK 80ml SVE 1cm/30%/-3/post/mod   1250 141/79 GHTN  1426 3/30%/-3  COOK in place  1432 pitocin 2mU        ASSESSMENT:  ==============  Deisi TRINH Steffayn [place]  29 year old  female  Estimated Date of Delivery: 2025  IUP @ 41w0d for induction of labor.  Indication: Prolonged pregnancy   Fetal Heart Rate Tracing category one over the last 120 minutes  GBS- negative  Anemic 10.8   TOLAC  Patient Active Problem List   Diagnosis    High-risk pregnancy    Vitamin D deficiency    History of  delivery planning TOLAC    Anemia during pregnancy in third trimester    Encounter for induction of labor    Iron deficiency anemia secondary to inadequate dietary iron intake    Gestational hypertension, third trimester          PLAN:  ===========  - Discussed the option to add pitocin now that she has a Bravo score of 6.   - Anticipate progress and NSVB.   - Reevaluate progress in 2-3 hours or sooner with a change in status.  - Encouraged frequent position changes to facilitate labor and fetal descent.  - MD team aware of pt's TOLAC status      Clinically Significant Risk Factors Present on Admission         # Hyponatremia: Lowest Na = 134 mmol/L in last 2 days, will monitor as appropriate       # Hypoalbuminemia: Lowest albumin = 3.3 g/dL at 2025 10:14 AM, will monitor as appropriate     # Hypertension: Noted on problem list      # Anemia: based on hgb <11                    Irma Yoon CNM

## 2025-01-28 NOTE — PROVIDER NOTIFICATION
25 0826   Provider Notification   Provider Name/Title Dr. Maki   Method of Notification Electronic Page   Request Evaluate in Person   Notification Reason Patient Arrived;Status Update     Patient has arrived for IOL for TOLAC.  41 weeks. Denies vaginal bleeding or leakage of fluid. Denies pain or contractions. She had one mild range BP. Has +2 pitting edema to bilateral feet. Denies other s/s of preeclampsia. Baby is on continuous fetal monitoring.

## 2025-01-29 ENCOUNTER — VIRTUAL VISIT (OUTPATIENT)
Dept: INTERPRETER SERVICES | Facility: CLINIC | Age: 30
End: 2025-01-29
Payer: COMMERCIAL

## 2025-01-29 ENCOUNTER — APPOINTMENT (OUTPATIENT)
Dept: INTERPRETER SERVICES | Facility: CLINIC | Age: 30
End: 2025-01-29
Payer: COMMERCIAL

## 2025-01-29 ENCOUNTER — OFFICE VISIT (OUTPATIENT)
Dept: INTERPRETER SERVICES | Facility: CLINIC | Age: 30
End: 2025-01-29
Payer: COMMERCIAL

## 2025-01-29 LAB
ALBUMIN SERPL BCG-MCNC: 3 G/DL (ref 3.5–5.2)
ALP SERPL-CCNC: 319 U/L (ref 40–150)
ALT SERPL W P-5'-P-CCNC: 26 U/L (ref 0–50)
ANION GAP SERPL CALCULATED.3IONS-SCNC: 16 MMOL/L (ref 7–15)
AST SERPL W P-5'-P-CCNC: 31 U/L (ref 0–45)
BILIRUB SERPL-MCNC: 1 MG/DL
BUN SERPL-MCNC: 8.7 MG/DL (ref 6–20)
CALCIUM SERPL-MCNC: 8.5 MG/DL (ref 8.8–10.4)
CHLORIDE SERPL-SCNC: 101 MMOL/L (ref 98–107)
CREAT SERPL-MCNC: 0.71 MG/DL (ref 0.51–0.95)
EGFRCR SERPLBLD CKD-EPI 2021: >90 ML/MIN/1.73M2
ERYTHROCYTE [DISTWIDTH] IN BLOOD BY AUTOMATED COUNT: 15.4 % (ref 10–15)
GLUCOSE SERPL-MCNC: 65 MG/DL (ref 70–99)
HCO3 SERPL-SCNC: 18 MMOL/L (ref 22–29)
HCT VFR BLD AUTO: 33.4 % (ref 35–47)
HGB BLD-MCNC: 11 G/DL (ref 11.7–15.7)
HOLD SPECIMEN: NORMAL
MCH RBC QN AUTO: 28.8 PG (ref 26.5–33)
MCHC RBC AUTO-ENTMCNC: 32.9 G/DL (ref 31.5–36.5)
MCV RBC AUTO: 87 FL (ref 78–100)
PLATELET # BLD AUTO: 154 10E3/UL (ref 150–450)
POTASSIUM SERPL-SCNC: 3.9 MMOL/L (ref 3.4–5.3)
PROT SERPL-MCNC: 5.8 G/DL (ref 6.4–8.3)
RBC # BLD AUTO: 3.82 10E6/UL (ref 3.8–5.2)
SODIUM SERPL-SCNC: 135 MMOL/L (ref 135–145)
WBC # BLD AUTO: 9.9 10E3/UL (ref 4–11)

## 2025-01-29 PROCEDURE — 250N000013 HC RX MED GY IP 250 OP 250 PS 637: Performed by: ADVANCED PRACTICE MIDWIFE

## 2025-01-29 PROCEDURE — 272N000001 HC OR GENERAL SUPPLY STERILE: Performed by: STUDENT IN AN ORGANIZED HEALTH CARE EDUCATION/TRAINING PROGRAM

## 2025-01-29 PROCEDURE — 258N000003 HC RX IP 258 OP 636

## 2025-01-29 PROCEDURE — 80053 COMPREHEN METABOLIC PANEL: CPT | Performed by: ADVANCED PRACTICE MIDWIFE

## 2025-01-29 PROCEDURE — 360N000076 HC SURGERY LEVEL 3, PER MIN: Performed by: STUDENT IN AN ORGANIZED HEALTH CARE EDUCATION/TRAINING PROGRAM

## 2025-01-29 PROCEDURE — 99207 PR NO CHARGE LOS: CPT | Performed by: OBSTETRICS & GYNECOLOGY

## 2025-01-29 PROCEDURE — 85048 AUTOMATED LEUKOCYTE COUNT: CPT | Performed by: ADVANCED PRACTICE MIDWIFE

## 2025-01-29 PROCEDURE — 250N000009 HC RX 250: Performed by: STUDENT IN AN ORGANIZED HEALTH CARE EDUCATION/TRAINING PROGRAM

## 2025-01-29 PROCEDURE — T1013 SIGN LANG/ORAL INTERPRETER: HCPCS

## 2025-01-29 PROCEDURE — 258N000003 HC RX IP 258 OP 636: Performed by: ADVANCED PRACTICE MIDWIFE

## 2025-01-29 PROCEDURE — 250N000011 HC RX IP 250 OP 636: Performed by: STUDENT IN AN ORGANIZED HEALTH CARE EDUCATION/TRAINING PROGRAM

## 2025-01-29 PROCEDURE — 85018 HEMOGLOBIN: CPT | Performed by: ADVANCED PRACTICE MIDWIFE

## 2025-01-29 PROCEDURE — 250N000009 HC RX 250: Performed by: ADVANCED PRACTICE MIDWIFE

## 2025-01-29 PROCEDURE — 258N000003 HC RX IP 258 OP 636: Performed by: STUDENT IN AN ORGANIZED HEALTH CARE EDUCATION/TRAINING PROGRAM

## 2025-01-29 PROCEDURE — 36415 COLL VENOUS BLD VENIPUNCTURE: CPT | Performed by: ADVANCED PRACTICE MIDWIFE

## 2025-01-29 PROCEDURE — 250N000011 HC RX IP 250 OP 636

## 2025-01-29 PROCEDURE — 120N000002 HC R&B MED SURG/OB UMMC

## 2025-01-29 PROCEDURE — 271N000001 HC OR GENERAL SUPPLY NON-STERILE: Performed by: STUDENT IN AN ORGANIZED HEALTH CARE EDUCATION/TRAINING PROGRAM

## 2025-01-29 PROCEDURE — 250N000013 HC RX MED GY IP 250 OP 250 PS 637: Performed by: STUDENT IN AN ORGANIZED HEALTH CARE EDUCATION/TRAINING PROGRAM

## 2025-01-29 PROCEDURE — 10907ZC DRAINAGE OF AMNIOTIC FLUID, THERAPEUTIC FROM PRODUCTS OF CONCEPTION, VIA NATURAL OR ARTIFICIAL OPENING: ICD-10-PCS | Performed by: OBSTETRICS & GYNECOLOGY

## 2025-01-29 PROCEDURE — T1013 SIGN LANG/ORAL INTERPRETER: HCPCS | Mod: GT,TEL,95

## 2025-01-29 PROCEDURE — 370N000017 HC ANESTHESIA TECHNICAL FEE, PER MIN: Performed by: STUDENT IN AN ORGANIZED HEALTH CARE EDUCATION/TRAINING PROGRAM

## 2025-01-29 PROCEDURE — 710N000010 HC RECOVERY PHASE 1, LEVEL 2, PER MIN: Performed by: STUDENT IN AN ORGANIZED HEALTH CARE EDUCATION/TRAINING PROGRAM

## 2025-01-29 PROCEDURE — 250N000009 HC RX 250

## 2025-01-29 PROCEDURE — 250N000011 HC RX IP 250 OP 636: Performed by: ADVANCED PRACTICE MIDWIFE

## 2025-01-29 RX ORDER — OXYTOCIN 10 [USP'U]/ML
INJECTION, SOLUTION INTRAMUSCULAR; INTRAVENOUS
Status: DISCONTINUED
Start: 2025-01-29 | End: 2025-01-29 | Stop reason: HOSPADM

## 2025-01-29 RX ORDER — METOCLOPRAMIDE 10 MG/1
10 TABLET ORAL EVERY 6 HOURS PRN
Status: DISCONTINUED | OUTPATIENT
Start: 2025-01-29 | End: 2025-01-30

## 2025-01-29 RX ORDER — IBUPROFEN 800 MG/1
800 TABLET, FILM COATED ORAL EVERY 6 HOURS PRN
Status: DISCONTINUED | OUTPATIENT
Start: 2025-01-29 | End: 2025-01-29

## 2025-01-29 RX ORDER — TRANEXAMIC ACID 10 MG/ML
1 INJECTION, SOLUTION INTRAVENOUS EVERY 30 MIN PRN
Status: DISCONTINUED | OUTPATIENT
Start: 2025-01-29 | End: 2025-01-30

## 2025-01-29 RX ORDER — FENTANYL CITRATE 50 UG/ML
INJECTION, SOLUTION INTRAMUSCULAR; INTRAVENOUS PRN
Status: DISCONTINUED | OUTPATIENT
Start: 2025-01-29 | End: 2025-01-29

## 2025-01-29 RX ORDER — AZITHROMYCIN 500 MG/5ML
500 INJECTION, POWDER, LYOPHILIZED, FOR SOLUTION INTRAVENOUS
Status: COMPLETED | OUTPATIENT
Start: 2025-01-29 | End: 2025-01-29

## 2025-01-29 RX ORDER — NALOXONE HYDROCHLORIDE 0.4 MG/ML
0.2 INJECTION, SOLUTION INTRAMUSCULAR; INTRAVENOUS; SUBCUTANEOUS
Status: DISCONTINUED | OUTPATIENT
Start: 2025-01-29 | End: 2025-01-30

## 2025-01-29 RX ORDER — BUPIVACAINE HYDROCHLORIDE 2.5 MG/ML
INJECTION, SOLUTION EPIDURAL; INFILTRATION; INTRACAUDAL
Status: DISCONTINUED | OUTPATIENT
Start: 2025-01-29 | End: 2025-01-29

## 2025-01-29 RX ORDER — METHYLERGONOVINE MALEATE 0.2 MG/ML
200 INJECTION INTRAVENOUS
Status: DISCONTINUED | OUTPATIENT
Start: 2025-01-29 | End: 2025-01-30

## 2025-01-29 RX ORDER — MODIFIED LANOLIN
OINTMENT (GRAM) TOPICAL
Status: DISCONTINUED | OUTPATIENT
Start: 2025-01-29 | End: 2025-01-29

## 2025-01-29 RX ORDER — NALOXONE HYDROCHLORIDE 0.4 MG/ML
0.4 INJECTION, SOLUTION INTRAMUSCULAR; INTRAVENOUS; SUBCUTANEOUS
Status: DISCONTINUED | OUTPATIENT
Start: 2025-01-29 | End: 2025-01-29

## 2025-01-29 RX ORDER — CARBOPROST TROMETHAMINE 250 UG/ML
250 INJECTION, SOLUTION INTRAMUSCULAR
Status: DISCONTINUED | OUTPATIENT
Start: 2025-01-29 | End: 2025-01-29

## 2025-01-29 RX ORDER — LIDOCAINE HYDROCHLORIDE 10 MG/ML
INJECTION, SOLUTION EPIDURAL; INFILTRATION; INTRACAUDAL; PERINEURAL
Status: DISCONTINUED
Start: 2025-01-29 | End: 2025-01-29 | Stop reason: HOSPADM

## 2025-01-29 RX ORDER — OXYTOCIN/0.9 % SODIUM CHLORIDE 30/500 ML
100-340 PLASTIC BAG, INJECTION (ML) INTRAVENOUS CONTINUOUS PRN
Status: DISCONTINUED | OUTPATIENT
Start: 2025-01-29 | End: 2025-01-30

## 2025-01-29 RX ORDER — ACETAMINOPHEN 325 MG/1
975 TABLET ORAL ONCE
Status: COMPLETED | OUTPATIENT
Start: 2025-01-29 | End: 2025-01-29

## 2025-01-29 RX ORDER — NALOXONE HYDROCHLORIDE 0.4 MG/ML
0.2 INJECTION, SOLUTION INTRAMUSCULAR; INTRAVENOUS; SUBCUTANEOUS
Status: DISCONTINUED | OUTPATIENT
Start: 2025-01-29 | End: 2025-01-29

## 2025-01-29 RX ORDER — OXYTOCIN/0.9 % SODIUM CHLORIDE 30/500 ML
PLASTIC BAG, INJECTION (ML) INTRAVENOUS
Status: DISCONTINUED
Start: 2025-01-29 | End: 2025-01-29 | Stop reason: HOSPADM

## 2025-01-29 RX ORDER — OXYTOCIN 10 [USP'U]/ML
10 INJECTION, SOLUTION INTRAMUSCULAR; INTRAVENOUS
Status: DISCONTINUED | OUTPATIENT
Start: 2025-01-29 | End: 2025-01-30

## 2025-01-29 RX ORDER — CARBOPROST TROMETHAMINE 250 UG/ML
250 INJECTION, SOLUTION INTRAMUSCULAR
Status: DISCONTINUED | OUTPATIENT
Start: 2025-01-29 | End: 2025-01-30

## 2025-01-29 RX ORDER — NALOXONE HYDROCHLORIDE 0.4 MG/ML
0.4 INJECTION, SOLUTION INTRAMUSCULAR; INTRAVENOUS; SUBCUTANEOUS
Status: DISCONTINUED | OUTPATIENT
Start: 2025-01-29 | End: 2025-01-30

## 2025-01-29 RX ORDER — METOCLOPRAMIDE HYDROCHLORIDE 5 MG/ML
10 INJECTION INTRAMUSCULAR; INTRAVENOUS EVERY 6 HOURS PRN
Status: DISCONTINUED | OUTPATIENT
Start: 2025-01-29 | End: 2025-01-30

## 2025-01-29 RX ORDER — PROCHLORPERAZINE MALEATE 10 MG
10 TABLET ORAL EVERY 6 HOURS PRN
Status: DISCONTINUED | OUTPATIENT
Start: 2025-01-29 | End: 2025-01-30

## 2025-01-29 RX ORDER — OXYTOCIN/0.9 % SODIUM CHLORIDE 30/500 ML
340 PLASTIC BAG, INJECTION (ML) INTRAVENOUS CONTINUOUS PRN
Status: DISCONTINUED | OUTPATIENT
Start: 2025-01-29 | End: 2025-01-29

## 2025-01-29 RX ORDER — NALOXONE HYDROCHLORIDE 0.4 MG/ML
0.1 INJECTION, SOLUTION INTRAMUSCULAR; INTRAVENOUS; SUBCUTANEOUS
Status: DISCONTINUED | OUTPATIENT
Start: 2025-01-29 | End: 2025-01-30 | Stop reason: HOSPADM

## 2025-01-29 RX ORDER — CITRIC ACID/SODIUM CITRATE 334-500MG
30 SOLUTION, ORAL ORAL
Status: DISCONTINUED | OUTPATIENT
Start: 2025-01-29 | End: 2025-01-30

## 2025-01-29 RX ORDER — FENTANYL CITRATE-0.9 % NACL/PF 10 MCG/ML
100 PLASTIC BAG, INJECTION (ML) INTRAVENOUS EVERY 5 MIN PRN
Status: DISCONTINUED | OUTPATIENT
Start: 2025-01-29 | End: 2025-01-30

## 2025-01-29 RX ORDER — HYDROMORPHONE HCL IN WATER/PF 6 MG/30 ML
0.2 PATIENT CONTROLLED ANALGESIA SYRINGE INTRAVENOUS EVERY 5 MIN PRN
Status: DISCONTINUED | OUTPATIENT
Start: 2025-01-29 | End: 2025-01-30 | Stop reason: HOSPADM

## 2025-01-29 RX ORDER — MISOPROSTOL 200 UG/1
TABLET ORAL
Status: DISCONTINUED
Start: 2025-01-29 | End: 2025-01-29 | Stop reason: HOSPADM

## 2025-01-29 RX ORDER — ACETAMINOPHEN 325 MG/1
650 TABLET ORAL EVERY 4 HOURS PRN
Status: DISCONTINUED | OUTPATIENT
Start: 2025-01-29 | End: 2025-01-29

## 2025-01-29 RX ORDER — NALOXONE HYDROCHLORIDE 0.4 MG/ML
0.4 INJECTION, SOLUTION INTRAMUSCULAR; INTRAVENOUS; SUBCUTANEOUS
Status: DISCONTINUED | OUTPATIENT
Start: 2025-01-29 | End: 2025-01-30 | Stop reason: HOSPADM

## 2025-01-29 RX ORDER — SODIUM CHLORIDE, SODIUM LACTATE, POTASSIUM CHLORIDE, CALCIUM CHLORIDE 600; 310; 30; 20 MG/100ML; MG/100ML; MG/100ML; MG/100ML
INJECTION, SOLUTION INTRAVENOUS CONTINUOUS
Status: DISCONTINUED | OUTPATIENT
Start: 2025-01-29 | End: 2025-01-30 | Stop reason: HOSPADM

## 2025-01-29 RX ORDER — CEFAZOLIN SODIUM 1 G/3ML
1 INJECTION, POWDER, FOR SOLUTION INTRAMUSCULAR; INTRAVENOUS EVERY 8 HOURS
Status: DISCONTINUED | OUTPATIENT
Start: 2025-01-29 | End: 2025-01-29

## 2025-01-29 RX ORDER — LOPERAMIDE HYDROCHLORIDE 2 MG/1
2 CAPSULE ORAL
Status: DISCONTINUED | OUTPATIENT
Start: 2025-01-29 | End: 2025-01-30

## 2025-01-29 RX ORDER — OXYTOCIN/0.9 % SODIUM CHLORIDE 30/500 ML
PLASTIC BAG, INJECTION (ML) INTRAVENOUS CONTINUOUS PRN
Status: DISCONTINUED | OUTPATIENT
Start: 2025-01-29 | End: 2025-01-29

## 2025-01-29 RX ORDER — DIPHENHYDRAMINE HYDROCHLORIDE 50 MG/ML
50 INJECTION INTRAMUSCULAR; INTRAVENOUS
Status: COMPLETED | OUTPATIENT
Start: 2025-01-29 | End: 2025-01-29

## 2025-01-29 RX ORDER — KETOROLAC TROMETHAMINE 30 MG/ML
30 INJECTION, SOLUTION INTRAMUSCULAR; INTRAVENOUS
Status: COMPLETED | OUTPATIENT
Start: 2025-01-29 | End: 2025-01-29

## 2025-01-29 RX ORDER — LOPERAMIDE HYDROCHLORIDE 2 MG/1
2 CAPSULE ORAL
Status: DISCONTINUED | OUTPATIENT
Start: 2025-01-29 | End: 2025-01-29

## 2025-01-29 RX ORDER — IBUPROFEN 800 MG/1
800 TABLET, FILM COATED ORAL
Status: COMPLETED | OUTPATIENT
Start: 2025-01-29 | End: 2025-01-29

## 2025-01-29 RX ORDER — MISOPROSTOL 200 UG/1
400 TABLET ORAL
Status: DISCONTINUED | OUTPATIENT
Start: 2025-01-29 | End: 2025-01-29

## 2025-01-29 RX ORDER — ONDANSETRON 4 MG/1
4 TABLET, ORALLY DISINTEGRATING ORAL EVERY 6 HOURS PRN
Status: DISCONTINUED | OUTPATIENT
Start: 2025-01-29 | End: 2025-01-30

## 2025-01-29 RX ORDER — CEFAZOLIN SODIUM/WATER 2 G/20 ML
2 SYRINGE (ML) INTRAVENOUS
Status: COMPLETED | OUTPATIENT
Start: 2025-01-29 | End: 2025-01-29

## 2025-01-29 RX ORDER — METHYLERGONOVINE MALEATE 0.2 MG/ML
200 INJECTION INTRAVENOUS
Status: DISCONTINUED | OUTPATIENT
Start: 2025-01-29 | End: 2025-01-29

## 2025-01-29 RX ORDER — DIPHENHYDRAMINE HYDROCHLORIDE 50 MG/ML
50 INJECTION INTRAMUSCULAR; INTRAVENOUS EVERY 6 HOURS PRN
Status: DISCONTINUED | OUTPATIENT
Start: 2025-01-29 | End: 2025-01-29

## 2025-01-29 RX ORDER — OXYTOCIN/0.9 % SODIUM CHLORIDE 30/500 ML
340 PLASTIC BAG, INJECTION (ML) INTRAVENOUS CONTINUOUS PRN
Status: DISCONTINUED | OUTPATIENT
Start: 2025-01-29 | End: 2025-01-30 | Stop reason: HOSPADM

## 2025-01-29 RX ORDER — OXYTOCIN/0.9 % SODIUM CHLORIDE 30/500 ML
1-24 PLASTIC BAG, INJECTION (ML) INTRAVENOUS CONTINUOUS
Status: DISCONTINUED | OUTPATIENT
Start: 2025-01-29 | End: 2025-01-30

## 2025-01-29 RX ORDER — NALOXONE HYDROCHLORIDE 0.4 MG/ML
0.2 INJECTION, SOLUTION INTRAMUSCULAR; INTRAVENOUS; SUBCUTANEOUS
Status: DISCONTINUED | OUTPATIENT
Start: 2025-01-29 | End: 2025-01-30 | Stop reason: HOSPADM

## 2025-01-29 RX ORDER — LIDOCAINE 40 MG/G
CREAM TOPICAL
Status: DISCONTINUED | OUTPATIENT
Start: 2025-01-29 | End: 2025-01-30

## 2025-01-29 RX ORDER — LOPERAMIDE HYDROCHLORIDE 2 MG/1
2 CAPSULE ORAL
Status: DISCONTINUED | OUTPATIENT
Start: 2025-01-29 | End: 2025-01-30 | Stop reason: HOSPADM

## 2025-01-29 RX ORDER — CITRIC ACID/SODIUM CITRATE 334-500MG
30 SOLUTION, ORAL ORAL
Status: COMPLETED | OUTPATIENT
Start: 2025-01-29 | End: 2025-01-29

## 2025-01-29 RX ORDER — ONDANSETRON 2 MG/ML
4 INJECTION INTRAMUSCULAR; INTRAVENOUS EVERY 30 MIN PRN
Status: DISCONTINUED | OUTPATIENT
Start: 2025-01-29 | End: 2025-01-30 | Stop reason: HOSPADM

## 2025-01-29 RX ORDER — LOPERAMIDE HYDROCHLORIDE 2 MG/1
4 CAPSULE ORAL
Status: DISCONTINUED | OUTPATIENT
Start: 2025-01-29 | End: 2025-01-30

## 2025-01-29 RX ORDER — TRANEXAMIC ACID 10 MG/ML
1 INJECTION, SOLUTION INTRAVENOUS EVERY 30 MIN PRN
Status: DISCONTINUED | OUTPATIENT
Start: 2025-01-29 | End: 2025-01-30 | Stop reason: HOSPADM

## 2025-01-29 RX ORDER — BISACODYL 10 MG
10 SUPPOSITORY, RECTAL RECTAL DAILY PRN
Status: DISCONTINUED | OUTPATIENT
Start: 2025-01-29 | End: 2025-01-29

## 2025-01-29 RX ORDER — MISOPROSTOL 200 UG/1
400 TABLET ORAL
Status: DISCONTINUED | OUTPATIENT
Start: 2025-01-29 | End: 2025-01-30 | Stop reason: HOSPADM

## 2025-01-29 RX ORDER — LIDOCAINE HCL/EPINEPHRINE/PF 2%-1:200K
VIAL (ML) INJECTION PRN
Status: DISCONTINUED | OUTPATIENT
Start: 2025-01-29 | End: 2025-01-29

## 2025-01-29 RX ORDER — FENTANYL/ROPIVACAINE/NS/PF 2MCG/ML-.1
PLASTIC BAG, INJECTION (ML) EPIDURAL
Status: DISCONTINUED | OUTPATIENT
Start: 2025-01-29 | End: 2025-01-30

## 2025-01-29 RX ORDER — LIDOCAINE 40 MG/G
CREAM TOPICAL
Status: DISCONTINUED | OUTPATIENT
Start: 2025-01-29 | End: 2025-01-30 | Stop reason: HOSPADM

## 2025-01-29 RX ORDER — SODIUM CHLORIDE, SODIUM LACTATE, POTASSIUM CHLORIDE, CALCIUM CHLORIDE 600; 310; 30; 20 MG/100ML; MG/100ML; MG/100ML; MG/100ML
INJECTION, SOLUTION INTRAVENOUS CONTINUOUS PRN
Status: DISCONTINUED | OUTPATIENT
Start: 2025-01-29 | End: 2025-01-29

## 2025-01-29 RX ORDER — DOCUSATE SODIUM 100 MG/1
100 CAPSULE, LIQUID FILLED ORAL DAILY
Status: DISCONTINUED | OUTPATIENT
Start: 2025-01-29 | End: 2025-01-29

## 2025-01-29 RX ORDER — OXYTOCIN 10 [USP'U]/ML
10 INJECTION, SOLUTION INTRAMUSCULAR; INTRAVENOUS
Status: DISCONTINUED | OUTPATIENT
Start: 2025-01-29 | End: 2025-01-29

## 2025-01-29 RX ORDER — MISOPROSTOL 200 UG/1
800 TABLET ORAL
Status: DISCONTINUED | OUTPATIENT
Start: 2025-01-29 | End: 2025-01-30

## 2025-01-29 RX ORDER — CEFAZOLIN SODIUM/WATER 2 G/20 ML
2 SYRINGE (ML) INTRAVENOUS SEE ADMIN INSTRUCTIONS
Status: DISCONTINUED | OUTPATIENT
Start: 2025-01-29 | End: 2025-01-30 | Stop reason: HOSPADM

## 2025-01-29 RX ORDER — ONDANSETRON 4 MG/1
4 TABLET, ORALLY DISINTEGRATING ORAL EVERY 30 MIN PRN
Status: DISCONTINUED | OUTPATIENT
Start: 2025-01-29 | End: 2025-01-30 | Stop reason: HOSPADM

## 2025-01-29 RX ORDER — LOPERAMIDE HYDROCHLORIDE 2 MG/1
4 CAPSULE ORAL
Status: DISCONTINUED | OUTPATIENT
Start: 2025-01-29 | End: 2025-01-30 | Stop reason: HOSPADM

## 2025-01-29 RX ORDER — MISOPROSTOL 200 UG/1
400 TABLET ORAL
Status: DISCONTINUED | OUTPATIENT
Start: 2025-01-29 | End: 2025-01-30

## 2025-01-29 RX ORDER — CARBOPROST TROMETHAMINE 250 UG/ML
250 INJECTION, SOLUTION INTRAMUSCULAR
Status: DISCONTINUED | OUTPATIENT
Start: 2025-01-29 | End: 2025-01-30 | Stop reason: HOSPADM

## 2025-01-29 RX ORDER — MISOPROSTOL 200 UG/1
800 TABLET ORAL
Status: DISCONTINUED | OUTPATIENT
Start: 2025-01-29 | End: 2025-01-29

## 2025-01-29 RX ORDER — OXYCODONE HYDROCHLORIDE 5 MG/1
5 TABLET ORAL EVERY 4 HOURS PRN
Status: DISCONTINUED | OUTPATIENT
Start: 2025-01-29 | End: 2025-01-29

## 2025-01-29 RX ORDER — ONDANSETRON 2 MG/ML
4 INJECTION INTRAMUSCULAR; INTRAVENOUS EVERY 6 HOURS PRN
Status: DISCONTINUED | OUTPATIENT
Start: 2025-01-29 | End: 2025-01-30

## 2025-01-29 RX ORDER — OXYTOCIN/0.9 % SODIUM CHLORIDE 30/500 ML
340 PLASTIC BAG, INJECTION (ML) INTRAVENOUS CONTINUOUS PRN
Status: DISCONTINUED | OUTPATIENT
Start: 2025-01-29 | End: 2025-01-30

## 2025-01-29 RX ORDER — FLUMAZENIL 0.1 MG/ML
0.2 INJECTION, SOLUTION INTRAVENOUS
Status: DISCONTINUED | OUTPATIENT
Start: 2025-01-29 | End: 2025-01-30 | Stop reason: HOSPADM

## 2025-01-29 RX ORDER — OXYTOCIN 10 [USP'U]/ML
10 INJECTION, SOLUTION INTRAMUSCULAR; INTRAVENOUS
Status: DISCONTINUED | OUTPATIENT
Start: 2025-01-29 | End: 2025-01-30 | Stop reason: HOSPADM

## 2025-01-29 RX ORDER — HYDROMORPHONE HCL IN WATER/PF 6 MG/30 ML
0.4 PATIENT CONTROLLED ANALGESIA SYRINGE INTRAVENOUS EVERY 5 MIN PRN
Status: DISCONTINUED | OUTPATIENT
Start: 2025-01-29 | End: 2025-01-30 | Stop reason: HOSPADM

## 2025-01-29 RX ORDER — MISOPROSTOL 200 UG/1
800 TABLET ORAL
Status: DISCONTINUED | OUTPATIENT
Start: 2025-01-29 | End: 2025-01-30 | Stop reason: HOSPADM

## 2025-01-29 RX ORDER — FENTANYL CITRATE 50 UG/ML
50 INJECTION, SOLUTION INTRAMUSCULAR; INTRAVENOUS EVERY 5 MIN PRN
Status: DISCONTINUED | OUTPATIENT
Start: 2025-01-29 | End: 2025-01-30 | Stop reason: HOSPADM

## 2025-01-29 RX ORDER — NALBUPHINE HYDROCHLORIDE 10 MG/ML
2.5-5 INJECTION INTRAMUSCULAR; INTRAVENOUS; SUBCUTANEOUS EVERY 6 HOURS PRN
Status: DISCONTINUED | OUTPATIENT
Start: 2025-01-29 | End: 2025-01-30

## 2025-01-29 RX ORDER — LOPERAMIDE HYDROCHLORIDE 2 MG/1
4 CAPSULE ORAL
Status: DISCONTINUED | OUTPATIENT
Start: 2025-01-29 | End: 2025-01-29

## 2025-01-29 RX ORDER — SODIUM CHLORIDE, SODIUM LACTATE, POTASSIUM CHLORIDE, CALCIUM CHLORIDE 600; 310; 30; 20 MG/100ML; MG/100ML; MG/100ML; MG/100ML
INJECTION, SOLUTION INTRAVENOUS CONTINUOUS
Status: DISCONTINUED | OUTPATIENT
Start: 2025-01-29 | End: 2025-01-30

## 2025-01-29 RX ORDER — HYDROCORTISONE 25 MG/G
CREAM TOPICAL 3 TIMES DAILY PRN
Status: DISCONTINUED | OUTPATIENT
Start: 2025-01-29 | End: 2025-01-29

## 2025-01-29 RX ORDER — SODIUM CHLORIDE, SODIUM LACTATE, POTASSIUM CHLORIDE, CALCIUM CHLORIDE 600; 310; 30; 20 MG/100ML; MG/100ML; MG/100ML; MG/100ML
INJECTION, SOLUTION INTRAVENOUS CONTINUOUS PRN
Status: DISCONTINUED | OUTPATIENT
Start: 2025-01-29 | End: 2025-01-30

## 2025-01-29 RX ORDER — FENTANYL CITRATE 50 UG/ML
25-50 INJECTION, SOLUTION INTRAMUSCULAR; INTRAVENOUS
Status: DISCONTINUED | OUTPATIENT
Start: 2025-01-29 | End: 2025-01-30 | Stop reason: HOSPADM

## 2025-01-29 RX ORDER — FENTANYL CITRATE 50 UG/ML
25 INJECTION, SOLUTION INTRAMUSCULAR; INTRAVENOUS EVERY 5 MIN PRN
Status: DISCONTINUED | OUTPATIENT
Start: 2025-01-29 | End: 2025-01-30 | Stop reason: HOSPADM

## 2025-01-29 RX ORDER — DEXAMETHASONE SODIUM PHOSPHATE 4 MG/ML
4 INJECTION, SOLUTION INTRA-ARTICULAR; INTRALESIONAL; INTRAMUSCULAR; INTRAVENOUS; SOFT TISSUE
Status: DISCONTINUED | OUTPATIENT
Start: 2025-01-29 | End: 2025-01-30 | Stop reason: HOSPADM

## 2025-01-29 RX ORDER — TRANEXAMIC ACID 10 MG/ML
1 INJECTION, SOLUTION INTRAVENOUS EVERY 30 MIN PRN
Status: DISCONTINUED | OUTPATIENT
Start: 2025-01-29 | End: 2025-01-29

## 2025-01-29 RX ORDER — METHYLERGONOVINE MALEATE 0.2 MG/ML
200 INJECTION INTRAVENOUS
Status: DISCONTINUED | OUTPATIENT
Start: 2025-01-29 | End: 2025-01-30 | Stop reason: HOSPADM

## 2025-01-29 RX ADMIN — DEXMEDETOMIDINE HYDROCHLORIDE 8 MCG: 100 INJECTION, SOLUTION INTRAVENOUS at 21:53

## 2025-01-29 RX ADMIN — SODIUM CHLORIDE, POTASSIUM CHLORIDE, SODIUM LACTATE AND CALCIUM CHLORIDE: 600; 310; 30; 20 INJECTION, SOLUTION INTRAVENOUS at 20:53

## 2025-01-29 RX ADMIN — SODIUM CHLORIDE, POTASSIUM CHLORIDE, SODIUM LACTATE AND CALCIUM CHLORIDE: 600; 310; 30; 20 INJECTION, SOLUTION INTRAVENOUS at 10:15

## 2025-01-29 RX ADMIN — PHENYLEPHRINE HYDROCHLORIDE 50 MCG/MIN: 10 INJECTION INTRAVENOUS at 21:09

## 2025-01-29 RX ADMIN — Medication: at 06:38

## 2025-01-29 RX ADMIN — PHENYLEPHRINE HYDROCHLORIDE 150 MCG: 10 INJECTION INTRAVENOUS at 21:37

## 2025-01-29 RX ADMIN — SODIUM CITRATE AND CITRIC ACID MONOHYDRATE 30 ML: 500; 334 SOLUTION ORAL at 20:55

## 2025-01-29 RX ADMIN — BUPIVACAINE HYDROCHLORIDE 20 ML: 2.5 INJECTION, SOLUTION EPIDURAL; INFILTRATION; INTRACAUDAL at 10:20

## 2025-01-29 RX ADMIN — SODIUM CHLORIDE, POTASSIUM CHLORIDE, SODIUM LACTATE AND CALCIUM CHLORIDE 100 ML/HR: 600; 310; 30; 20 INJECTION, SOLUTION INTRAVENOUS at 01:59

## 2025-01-29 RX ADMIN — Medication 2 G: at 21:03

## 2025-01-29 RX ADMIN — Medication 340 ML/HR: at 22:45

## 2025-01-29 RX ADMIN — LIDOCAINE HYDROCHLORIDE,EPINEPHRINE BITARTRATE 5 ML: 20; .005 INJECTION, SOLUTION EPIDURAL; INFILTRATION; INTRACAUDAL; PERINEURAL at 21:15

## 2025-01-29 RX ADMIN — MISOPROSTOL 800 MCG: 200 TABLET ORAL at 21:47

## 2025-01-29 RX ADMIN — SODIUM CHLORIDE, POTASSIUM CHLORIDE, SODIUM LACTATE AND CALCIUM CHLORIDE: 600; 310; 30; 20 INJECTION, SOLUTION INTRAVENOUS at 20:01

## 2025-01-29 RX ADMIN — KETOROLAC TROMETHAMINE 30 MG: 30 INJECTION, SOLUTION INTRAMUSCULAR at 22:02

## 2025-01-29 RX ADMIN — BUPIVACAINE 20 ML: 13.3 INJECTION, SUSPENSION, LIPOSOMAL INFILTRATION at 10:20

## 2025-01-29 RX ADMIN — Medication 6 ML: at 06:36

## 2025-01-29 RX ADMIN — FAMOTIDINE 20 MG: 10 INJECTION, SOLUTION INTRAVENOUS at 20:57

## 2025-01-29 RX ADMIN — ACETAMINOPHEN 975 MG: 325 TABLET, FILM COATED ORAL at 20:55

## 2025-01-29 RX ADMIN — Medication: at 17:00

## 2025-01-29 RX ADMIN — LIDOCAINE HYDROCHLORIDE,EPINEPHRINE BITARTRATE 5 ML: 20; .005 INJECTION, SOLUTION EPIDURAL; INFILTRATION; INTRACAUDAL; PERINEURAL at 21:09

## 2025-01-29 RX ADMIN — DIPHENHYDRAMINE HYDROCHLORIDE 50 MG: 50 INJECTION, SOLUTION INTRAMUSCULAR; INTRAVENOUS at 17:55

## 2025-01-29 RX ADMIN — ONDANSETRON 4 MG: 2 INJECTION INTRAMUSCULAR; INTRAVENOUS at 21:15

## 2025-01-29 RX ADMIN — LIDOCAINE HYDROCHLORIDE,EPINEPHRINE BITARTRATE 5 ML: 20; .005 INJECTION, SOLUTION EPIDURAL; INFILTRATION; INTRACAUDAL; PERINEURAL at 21:00

## 2025-01-29 RX ADMIN — DEXMEDETOMIDINE HYDROCHLORIDE 8 MCG: 100 INJECTION, SOLUTION INTRAVENOUS at 21:56

## 2025-01-29 RX ADMIN — Medication 600 ML/HR: at 21:32

## 2025-01-29 RX ADMIN — CARBOPROST TROMETHAMINE 250 MCG: 250 INJECTION, SOLUTION INTRAMUSCULAR at 21:36

## 2025-01-29 RX ADMIN — PHENYLEPHRINE HYDROCHLORIDE 25 MCG/MIN: 10 INJECTION INTRAVENOUS at 21:15

## 2025-01-29 RX ADMIN — LIDOCAINE HYDROCHLORIDE,EPINEPHRINE BITARTRATE 5 ML: 20; .005 INJECTION, SOLUTION EPIDURAL; INFILTRATION; INTRACAUDAL; PERINEURAL at 21:51

## 2025-01-29 RX ADMIN — Medication 500 MG: at 20:54

## 2025-01-29 RX ADMIN — FENTANYL CITRATE 25 MCG: 50 INJECTION INTRAMUSCULAR; INTRAVENOUS at 22:00

## 2025-01-29 ASSESSMENT — ACTIVITIES OF DAILY LIVING (ADL)
ADLS_ACUITY_SCORE: 23
ADLS_ACUITY_SCORE: 20
ADLS_ACUITY_SCORE: 20
ADLS_ACUITY_SCORE: 23
ADLS_ACUITY_SCORE: 23
ADLS_ACUITY_SCORE: 20
ADLS_ACUITY_SCORE: 23
ADLS_ACUITY_SCORE: 20
ADLS_ACUITY_SCORE: 20
ADLS_ACUITY_SCORE: 23
ADLS_ACUITY_SCORE: 23
ADLS_ACUITY_SCORE: 20
ADLS_ACUITY_SCORE: 23
ADLS_ACUITY_SCORE: 20
ADLS_ACUITY_SCORE: 23
ADLS_ACUITY_SCORE: 23

## 2025-01-29 NOTE — PROGRESS NOTES
"Labor Progress Note     SUBJECTIVE:  ==============  Deisi Jeter [place]  Estimated Date of Delivery: 2025  General appearance: feels pressure, agreeable to cervical exam and arom if appropriate  Support: Partner at bedside    An  was present  In Person throughout the visit today. After discussing my assessment and treatment recommendations including risks and benefits with the patient, and family.  I asked the patient, and family to convey what they understood about my assessment and recommendations to ensure understanding. The communication back from the patient, and family, as relayed through the , confirmed understanding. Thepatient, and family was also given time to have all questions answered.   name: Rosy #[id number]        OBJECTIVE:  ==============  VITALS  Blood pressure 135/67, temperature 98.4  F (36.9  C), temperature source Oral, resp. rate 18, height 1.55 m (5' 1.02\"), weight 71.7 kg (158 lb), last menstrual period 2024, SpO2 (P) 98%.  Patient Vitals for the past 24 hrs:   BP Temp Temp src Resp SpO2   25 1747 135/67 -- -- -- (P) 98 %   25 1746 131/70 -- -- -- 98 %   25 1743 (!) 143/75 -- -- -- (P) 98 %   25 1741 134/74 -- -- -- 98 %   25 1739 137/80 -- -- -- 98 %   25 1737 133/79 -- -- -- 98 %   25 1736 125/73 -- -- -- 98 %   25 1545 (!) 153/79 98.4  F (36.9  C) Oral 18 98 %   25 1438 131/71 98.3  F (36.8  C) Oral 17 99 %   25 1340 123/65 -- -- -- 97 %   25 1238 130/69 -- -- 15 96 %   25 1139 127/65 98.4  F (36.9  C) Oral 16 97 %   25 0945 131/67 98.1  F (36.7  C) Oral 16 98 %   2531 139/82 -- -- -- 99 %   2501 136/77 -- -- -- 98 %   25 129/69 98.3  F (36.8  C) Oral 16 97 %   2526 127/64 -- -- -- 97 %   2521 124/63 -- -- -- 95 %   2517 133/64 -- -- -- --   2516 -- -- -- -- 97 %   2513 130/60 -- " -- -- --   01/29/25 0705 130/60 -- -- -- 97 %   01/29/25 0700 129/63 -- -- 20 97 %   01/29/25 0655 126/66 -- -- -- 97 %   01/29/25 0650 121/63 -- -- -- 98 %   01/29/25 0648 131/64 -- -- -- --   01/29/25 0646 125/60 -- -- -- --   01/29/25 0644 124/62 -- -- -- --   01/29/25 0643 126/65 -- -- -- --   01/29/25 0641 128/67 -- -- -- 99 %   01/29/25 0639 136/67 -- -- -- --   01/29/25 0637 137/69 -- -- -- --   01/29/25 0603 138/74 98  F (36.7  C) Oral 20 --   01/29/25 0151 132/75 98.6  F (37  C) Oral 20 --   01/28/25 2338 127/68 98  F (36.7  C) Oral -- --   01/28/25 2149 127/70 97.9  F (36.6  C) Oral -- --   01/28/25 1931 123/68 98.2  F (36.8  C) Oral 20 --   01/28/25 1900 123/71 98.4  F (36.9  C) Oral 16 --   01/28/25 1800 131/69 -- -- -- --       FETAL HEART RATE ASSESSMENT:  Baseline rate 150, normal  Variability moderate  Accelerations present   Decelerations not present     CONTRACTIONS: Contractions every 2-3 minutes.  Palpate: moderate  Pitocin- 16 mu/min.,  Antibiotics- none    ROM: Large amount of fluid in bag posterior to fetal head. Dr. Stewart called for consult-   Exam and AROM performed by moderate meconium fluid  PELVIC EXAM: 6.5/ 80/ Anterior/ soft/ -2, head lower- prior to AROM    6/80/-2 after AROM    # Pain Assessment:      1/29/2025     3:45 PM   Current Pain Score   Patient currently in pain? yes   Patel pain level was assessed and she currently denies pain.        FETAL HEART RATE ASSESSMENT:  Reviewed fetal monitoring strip at bedside  EFM interpretation suggests: absence of concern for metabolic acidemia due to: presence of accelerations and moderate variability. EFM suggests no concern for interruption of the oxygen pathway..    Labor course:  0745 143/83  0930 COOK 80ml SVE 1cm/30%/-3/post/mod   1250 141/79 GHTN  1426 3/30%/-3  COOK in place  1432 pitocin 2mU  1820 pitocin 3mU  1933 Pit @6, Cook remains in place  2325 Cook removed with traction VE 6/30/-3  0200 Pit decreased to 4 due to  tachysystole per RN, 500ml LR bolus done  0351 6/60/-2  0605 Requesting epidural  1020 6.5/50/-3  1430 AROM moderate mec SVE 6.5/80/-2 pit@16    ASSESSMENT:  ==============  Deisi TRINH Steffany [place]  29 year old  female  Estimated Date of Delivery: 2025  IUP @ 41w1d for induction of labor.  Indication: gestational hypertension   Fetal Heart Rate Tracing category one over the last 60 minutes  GBS- negative    Patient Active Problem List   Diagnosis    High-risk pregnancy    Vitamin D deficiency    History of  delivery planning TOLAC    Anemia during pregnancy in third trimester    Encounter for induction of labor    Iron deficiency anemia secondary to inadequate dietary iron intake    Gestational hypertension, third trimester    Labor and delivery indication for care or intervention          PLAN:  ===========  During SVE by CNM, small forebag ruptured with light mec noted, larger BOW felt behind fetal head. Sought consultation with Dr. Stewart to evaluate for controlled AROM- see her note.   Moderate meconium upon rupture with fetal head well applied.   Questions answered via . Patient placed in high thrones.  Continue pitocin titration to adequate contraction pattern.   Consider IUPC at next exam. Reevaluate in 2 hours or prn per maternal/fetal indications.    Geno Estrada CNM BSN RN Student Nurse Midwife  I, Meaghan Barraza, am serving as a scribe; to document services personally performed by  Geno RAMOS CNM based on data collection and the provider's statements to me.     Meaghan Barraza    The encounter was performed by me and scribed by the SNM. The scribed note accurately reflects my personal services and decisions made by me.     LAURIE Esparza CNM

## 2025-01-29 NOTE — PLAN OF CARE
Goal Outcome Evaluation:  Tachysystole Note  Data:  FHT's category one.  Contraction pattern: frequency q 1-2 minutes and Strength moderate.  Interventions:  -patient repositioned to left lateral position  -IV bolus started of 500 cc's of LR given  -Pitocin decreased to 4 milliunits  -CNM Sanchez notified.   Response: Pt uncomfortable during ctx's, declines epidural at this time.

## 2025-01-29 NOTE — PROVIDER NOTIFICATION
01/29/25 0350   Provider Notification   Provider Name/Title Beba Sanchez CNM   Method of Notification At Bedside   Notification Reason SVE     Provider at bedside for SVE: 6/30/-2. Provider gave patient different options on how to proceed including, breaking bag of water, getting epidural, or waiting another 2 hours after doing position changes. Patient decided she wants to wait and do some position changes and check in again in 2 hours.

## 2025-01-29 NOTE — PROGRESS NOTES
"Labor Progress Note     SUBJECTIVE:  ==============  Deisi Carl  Estimated Date of Delivery: 2025    Received epidural, is now comfortable, resting.       OBJECTIVE:  ==============  VITALS  Blood pressure 127/65, temperature 98.1  F (36.7  C), temperature source Oral, resp. rate 16, height 1.55 m (5' 1.02\"), weight 71.7 kg (158 lb), last menstrual period 2024, SpO2 97%.  Patient Vitals for the past 24 hrs:   BP Temp Temp src Resp SpO2   25 1139 127/65 -- -- 16 97 %   25 0945 131/67 98.1  F (36.7  C) Oral 16 98 %   25 0831 139/82 -- -- -- 99 %   25 0801 136/77 -- -- -- 98 %   25 0731 129/69 98.3  F (36.8  C) Oral 16 97 %   25 0726 127/64 -- -- -- 97 %   25 0721 124/63 -- -- -- 95 %   25 0717 133/64 -- -- -- --   25 0716 -- -- -- -- 97 %   25 0713 130/60 -- -- -- --   25 0705 130/60 -- -- -- 97 %   25 0700 129/63 -- -- 20 97 %   25 0655 126/66 -- -- -- 97 %   25 0650 121/63 -- -- -- 98 %   25 0648 131/64 -- -- -- --   25 0646 125/60 -- -- -- --   25 0644 124/62 -- -- -- --   25 0643 126/65 -- -- -- --   25 0641 128/67 -- -- -- 99 %   25 0639 136/67 -- -- -- --   25 0637 137/69 -- -- -- --   25 0603 138/74 98  F (36.7  C) Oral 20 --   25 0151 132/75 98.6  F (37  C) Oral 20 --   25 2338 127/68 98  F (36.7  C) Oral -- --   25 2149 127/70 97.9  F (36.6  C) Oral -- --   25 1931 123/68 98.2  F (36.8  C) Oral 20 --   25 1900 123/71 98.4  F (36.9  C) Oral 16 --   25 1800 131/69 -- -- -- --   25 1700 135/77 98.4  F (36.9  C) Oral 16 --   25 1610 127/77 -- -- -- --   25 1455 135/83 -- -- -- --   25 1250 (!) 141/79 97.6  F (36.4  C) Oral 16 --       FETAL HEART RATE ASSESSMENT:  Baseline rate 145, normal  Variability moderate  Accelerations present   Decelerations not present       CONTRACTIONS: every 4-5 " minutes.  Palpate: moderate  Pitocin- 6 mu/min.,  Antibiotics- none    ROM: not ruptured  PELVIC EXAM:deferred    # Pain Assessment:  Comfortable after epidural      FETAL HEART RATE ASSESSMENT:  Reviewed fetal monitoring strip remotely via [employer]  EFM interpretation suggests: absence of concern for metabolic acidemia due to: presence of accelerations and moderate variability. EFM suggests no concern for interruption of the oxygen pathway..    Labor course:  25  0745 143/83  0930 COOK 80ml SVE 1cm/30%/-3/post/mod   1250 141/79 GHTN  1426 3/30%/-3  COOK in place  1432 pitocin 2mU  1820 pitocin 3mU  1933 Pit @6, Cook remains in place  2325 Cook removed with traction VE /3  0200 Pit decreased to 4 due to tachysystole per RN, 500ml LR bolus done  035/-2  0605 Pt requests epidural      ASSESSMENT:  ==============  Deisi TRINH Matchristopher Chrisney  29 year old  female  Estimated Date of Delivery: 2025  IUP @ 41w1d for induction of labor.  Indication: late term, now with GHTN   Fetal Heart Rate Tracing category one   GBS- negative    GHTN  Hx c/s- desires tolac  Patient Active Problem List   Diagnosis    High-risk pregnancy    Vitamin D deficiency    History of  delivery planning TOLAC    Anemia during pregnancy in third trimester    Encounter for induction of labor    Iron deficiency anemia secondary to inadequate dietary iron intake    Gestational hypertension, third trimester    Labor and delivery indication for care or intervention          PLAN:  ===========  - Encouraged frequent position changes to facilitate labor and fetal descent.  - Encouraged rest.   - Reevaluate in the next couple of hours, consider AROM if appropriate.      Clinically Significant Risk Factors         # Hyponatremia: Lowest Na = 134 mmol/L in last 2 days, will monitor as appropriate       # Hypoalbuminemia: Lowest albumin = 3.3 g/dL at 2025 10:14 AM, will monitor as appropriate     # Hypertension: Noted on  problem list                           Geno Estrada, LAURIE, CNM

## 2025-01-29 NOTE — PROVIDER NOTIFICATION
01/29/25 0344   Provider Notification   Provider Name/Title Beba Sanchez CNM   Method of Notification In Department   Request Evaluate in Person   Notification Reason SVE     Patient requesting to have a cervical check   Benign Hypertension    Breast Cancer  at 47 yo and 39 yo, bilateral mastectomy  CAD (coronary artery disease)    CVA (Cerebral Infarction)  1997 - no residual symptoms  Diabetes Mellitus    History of Obesity    HTN (hypertension)

## 2025-01-29 NOTE — PROGRESS NOTES
"Labor Progress Note     An  was present  In Person throughout the visit today. After discussing my assessment and treatment recommendations including risks and benefits with the patient.  I asked the patient to convey what they understood about my assessment and recommendations to ensure understanding. The communication back from the patient, as relayed through the , confirmed understanding. Thepatient was also given time to have all questions answered.   name: Wilber,  ID number: Ashtyn      SUBJECTIVE:  ==============  Deisi Jeter [place]  Estimated Date of Delivery: 2025  General appearance: uncomfortable with contractions- rates them 7/10 on pain scale. Wondering when to get the epidural but does not feel that she wants it now.   Support:  remains supportive at bedside.       OBJECTIVE:  ==============  VITALS  Blood pressure 132/75, temperature 98.6  F (37  C), temperature source Oral, resp. rate 20, height 1.55 m (5' 1.02\"), weight 71.7 kg (158 lb), last menstrual period 2024.  Patient Vitals for the past 24 hrs:   BP Temp Temp src Resp Height Weight   25 0151 132/75 98.6  F (37  C) Oral 20 -- --   25 2338 127/68 98  F (36.7  C) Oral -- -- --   25 2149 127/70 97.9  F (36.6  C) Oral -- -- --   25 1931 123/68 98.2  F (36.8  C) Oral 20 -- --   25 1900 123/71 98.4  F (36.9  C) Oral 16 -- --   25 1800 131/69 -- -- -- -- --   25 1700 135/77 98.4  F (36.9  C) Oral 16 -- --   25 1610 127/77 -- -- -- -- --   25 1455 135/83 -- -- -- -- --   25 1250 (!) 141/79 97.6  F (36.4  C) Oral 16 -- --   25 0830 120/65 -- -- -- -- --   25 0815 129/71 -- -- -- -- --   25 0800 127/73 98.6  F (37  C) Oral 16 -- --   25 0752 -- -- -- -- 1.55 m (5' 1.02\") 71.7 kg (158 lb)   25 0745 (!) 143/83 -- -- -- -- --       FETAL HEART RATE ASSESSMENT:  Baseline rate 145, " normal  Variability moderate  Accelerations present   Decelerations rare early decels, one variable decel and one late decel    CONTRACTIONS: Contractions every 2-3 minutes.   Pitocin- 4 mu/min.,  Antibiotics- none    ROM: not ruptured  PELVIC EXAM: 2 with BB during ctx @0351    # Pain Assessment:      2025     2:30 AM   Current Pain Score   Patient currently in pain? yes       FETAL HEART RATE ASSESSMENT:  Reviewed fetal monitoring strip on unit  EFM interpretation suggests: absence of concern for metabolic acidemia due to: presence of accelerations and moderate variability. EFM suggests no concern for interruption of the oxygen pathway..    Labor course:  25  0745 143/83  0930 COOK 80ml SVE 1cm/30%/-3/post/mod   1250 141/79 GHTN  1426 3/30%/-3  COOK in place  1432 pitocin 2mU  1820 pitocin 3mU  1933 Pit @6, Cook remains in place  2325 Cook removed with traction VE 6/30/-3  0200 Pit decreased to 4 due to tachysystole per RN, 500ml LR bolus done  035    ASSESSMENT:  ==============  Deisi TRINH Steffany   29 year old  female  Estimated Date of Delivery: 2025  IUP @ 41w1d for induction of labor.  Indication: postdates; TOLAC  Fetal Heart Rate Tracing category one over the last 120 minutes  GBS- negative    Patient Active Problem List   Diagnosis    High-risk pregnancy    Vitamin D deficiency    History of  delivery planning TOLAC    Anemia during pregnancy in third trimester    Encounter for induction of labor    Iron deficiency anemia secondary to inadequate dietary iron intake    Gestational hypertension, third trimester          PLAN:  ===========  - Anticipate progress and NSVB.   - Reevaluate progress in 2 hours or sooner with a change in status.  - Encouraged frequent position changes to facilitate labor and fetal descent.  - Reviewed option for epidural now or later, AROM now or later (discussed R/B/A). Pt would like to continue with Pitocin at this time and  re-eval in 2hrs.       Clinically Significant Risk Factors Present on Admission         # Hyponatremia: Lowest Na = 134 mmol/L in last 2 days, will monitor as appropriate       # Hypoalbuminemia: Lowest albumin = 3.3 g/dL at 1/28/2025 10:14 AM, will monitor as appropriate     # Hypertension: Noted on problem list      # Anemia: based on hgb <11                    Beba Sanchez CNM

## 2025-01-29 NOTE — CONSULTS
OB Consult Note    Reason for Consult: Assess for AROM    Patient seen with in person     Patient is a 30 yo  @ 41w1d who presented as a patient of our CNM service for IOL given postdates.  Patient has a history of 1 prior csection and desires TOLAC.  Patient has made slow cervical change and is currently on Pitocin, comfortable with Epidural.  Was asked to assess for AROM as CNM team concerned about head not being well applied.    On entry into room introduced myself to patient and asked if I could completed an exam to see if we could break her water in order to augment the labor progress.  Patient was agreeable.    On my exam patient with BBOW, head palpated, unable to palpate cord in bag that was posterior.  Moved forward to AROM and with digital pressure, BOW was ruptured, fetal head came down and now well applied to cervix.  ROM significant for meconium which was explained to the patient.  Plan for NICU at delivery.    Following ROM exam 6/80/-2.    Thanks you for the consultation on this patient.  Will signoff at this time.  Please reconsult with any further concerns.    Kaye Stewart MD

## 2025-01-29 NOTE — PROVIDER NOTIFICATION
01/29/25 0603   Provider Notification   Provider Name/Title Beba Sanchez CNM   Method of Notification At Bedside     Provider at bedside checking on patient. Patient states her contractions are stronger. Patient declines SVE at this time. Wants to wait until she's comfortable with her epidural.

## 2025-01-29 NOTE — PROGRESS NOTES
"Labor Progress Note     SUBJECTIVE:  ==============  Deisi Jeter [place]  Estimated Date of Delivery: 2025    Pt feeling a lot more pressure, uncomfortable     OBJECTIVE:  ==============  VITALS  Blood pressure 135/67, temperature 98.4  F (36.9  C), temperature source Oral, resp. rate 18, height 1.55 m (5' 1.02\"), weight 71.7 kg (158 lb), last menstrual period 2024, SpO2 (P) 98%.  Patient Vitals for the past 24 hrs:   BP Temp Temp src Resp SpO2   25 1747 135/67 -- -- -- (P) 98 %   25 1746 131/70 -- -- -- 98 %   25 1743 (!) 143/75 -- -- -- (P) 98 %   25 1741 134/74 -- -- -- 98 %   25 1739 137/80 -- -- -- 98 %   25 1737 133/79 -- -- -- 98 %   25 1736 125/73 -- -- -- 98 %   25 1545 (!) 153/79 98.4  F (36.9  C) Oral 18 98 %   25 1438 131/71 98.3  F (36.8  C) Oral 17 99 %   25 1340 123/65 -- -- -- 97 %   25 1238 130/69 -- -- 15 96 %   25 1139 127/65 98.4  F (36.9  C) Oral 16 97 %   25 0945 131/67 98.1  F (36.7  C) Oral 16 98 %   25 0831 139/82 -- -- -- 99 %   25 0801 136/77 -- -- -- 98 %   25 0731 129/69 98.3  F (36.8  C) Oral 16 97 %   25 0726 127/64 -- -- -- 97 %   25 0721 124/63 -- -- -- 95 %   25 0717 133/64 -- -- -- --   25 0716 -- -- -- -- 97 %   25 0713 130/60 -- -- -- --   25 0705 130/60 -- -- -- 97 %   25 0700 129/63 -- -- 20 97 %   25 0655 126/66 -- -- -- 97 %   25 0650 121/63 -- -- -- 98 %   25 0648 131/64 -- -- -- --   25 0646 125/60 -- -- -- --   25 0644 124/62 -- -- -- --   25 0643 126/65 -- -- -- --   25 0641 128/67 -- -- -- 99 %   25 0639 136/67 -- -- -- --   25 0637 137/69 -- -- -- --   25 0603 138/74 98  F (36.7  C) Oral 20 --   25 0151 132/75 98.6  F (37  C) Oral 20 --   25 2338 127/68 98  F (36.7  C) Oral -- --   259 127/70 97.9  F (36.6  C) Oral -- --   25 " 193 123/68 98.2  F (36.8  C) Oral 20 --   25 1900 123/71 98.4  F (36.9  C) Oral 16 --       FETAL HEART RATE ASSESSMENT:  Baseline rate 140, normal  Variability moderate  Accelerations present   Decelerations not present       CONTRACTIONS: every 2-4 minutes.  Palpate: strong, MVU < 150  Pitocin- 18 mu/min.,  Antibiotics- none    ROM:  meconium fluid  PELVIC EXAM:  5cm/very swollen- 70/-2, caput/molding; IUPC placed    # Pain Assessment:      2025     3:45 PM   Current Pain Score   Patient currently in pain? yes   - Deisi is experiencing pain due to contractions, pressure. Pain management was discussed and the plan was created in a collaborative fashion.  Deisi's response to the current recommendations: engaged  - page anesthesia       FETAL HEART RATE ASSESSMENT:  Reviewed fetal monitoring strip at bedside  EFM interpretation suggests: absence of concern for metabolic acidemia due to: presence of accelerations and moderate variability. EFM suggests no concern for interruption of the oxygen pathway..      Labor course:  25  0745 143/83  0930 COOK 80ml SVE 1cm/30%/-3/post/mod   1250 141/79 GHTN  1426 3/30%/-3  COOK in place  1432 pitocin 2mU  1820 pitocin 3mU  1933 Pit @6, Cook remains in place  2325 Cook removed with traction VE /-3  0200 Pit decreased to 4 due to tachysystole per RN, 500ml LR bolus done  0351 6/60/-2  0605 Pt requests epidural  1019- 6.5/50/-3, Head not applied, ballotable, BOW taut with contraction  1432- 6/80/-2  1435-  AROM, medium meconium- plan NICU at delivery  1632- very swollen, 5cm, caput molding, pt uncomfortable asking about c/s, IUPC placed        ASSESSMENT:  ==============  Deisi TRINH Steffany [place]  29 year old  female  Estimated Date of Delivery: 2025  IUP @ 41w1d for induction of labor.  Indication: late term, now with GHTN   Fetal Heart Rate Tracing category one over   GBS- negative    GHTN  Hx c/s- desires tolac  Patient Active Problem List    Diagnosis    High-risk pregnancy    Vitamin D deficiency    History of  delivery planning TOLAC    Anemia during pregnancy in third trimester    Encounter for induction of labor    Iron deficiency anemia secondary to inadequate dietary iron intake    Gestational hypertension, third trimester    Labor and delivery indication for care or intervention          PLAN:  ===========  IUPC placed with pt consent.   Cervix now swollen, feels OP.   Pt asking about repeat c/s d/t pain and feels that labor is not progressing.   Discussed pt always has the choice to have repeat c/s if she desires.   Reviewed if the main concern for her at this time is pain, then we recommend anesthesia come evaluate.   Will also have MD team come to discuss c/s.   Pt agrees with this plan of care.    LAURIE Esparza CNM    Addendum @ 1800:    Dr. Kamara at bedside to discuss pt desire for continued labor or repeat  section.   At this time, no medical indication for c/s- cat 1 fetal tracing, has not met criteria for arrest of dilation.   Discussed she may choose to request a c/s at any time.     Anesthesia at bedside and administered bolus. Pt now comfortable.   Now resting following hands and knees. Per RN, more bloody show. IUPC in place with MVS <150.     Pt states she desires to continue to labor. C/S consent was signed in the event it is needed/requested.     Pt verbalized understanding and agrees with plan of care.     LAURIE Esparza CNM    Addendum @ 1812:    1807- severe range BP x 1, repeat 1812 -150/75.    Labs ordered. Will monitor closely.     LAURIE Esparza CNM

## 2025-01-29 NOTE — PROVIDER NOTIFICATION
01/29/25 0611   Provider Notification   Provider Name/Title Dr. [place]   Method of Notification Phone   Request Evaluate in Person   Notification Reason Patient Request     Patient requesting epidural

## 2025-01-29 NOTE — PROGRESS NOTES
"Labor Progress Note     An  was present  In Person throughout the visit today. After discussing my assessment and treatment recommendations including risks and benefits with the patient.  I asked the patient to convey what they understood about my assessment and recommendations to ensure understanding. The communication back from the patient, as relayed through the , confirmed understanding. Thepatient was also given time to have all questions answered.   name: Yris      SUBJECTIVE:  ==============  Deisi GAYATHRI Jeter [place]  Estimated Date of Delivery: 2025  General appearance: uncomfortable with contractions. Notes tightening in abdomen and low back pain. Declines heat or Tylenol.  Support:  Marvin supportive at bedside.       OBJECTIVE:  ==============  VITALS  Blood pressure 123/68, temperature 98.2  F (36.8  C), temperature source Oral, resp. rate 20, height 1.55 m (5' 1.02\"), weight 71.7 kg (158 lb), last menstrual period 2024.  Patient Vitals for the past 24 hrs:   BP Temp Temp src Resp Height Weight   25 1931 123/68 98.2  F (36.8  C) Oral 20 -- --   25 1900 123/71 98.4  F (36.9  C) Oral 16 -- --   25 1800 131/69 -- -- -- -- --   25 1700 135/77 98.4  F (36.9  C) Oral 16 -- --   25 1610 127/77 -- -- -- -- --   25 1455 135/83 -- -- -- -- --   25 1250 (!) 141/79 97.6  F (36.4  C) Oral 16 -- --   25 0830 120/65 -- -- -- -- --   25 0815 129/71 -- -- -- -- --   25 0800 127/73 98.6  F (37  C) Oral 16 -- --   25 0752 -- -- -- -- 1.55 m (5' 1.02\") 71.7 kg (158 lb)   25 0745 (!) 143/83 -- -- -- -- --       FETAL HEART RATE ASSESSMENT:  Baseline rate 140s, normal  Variability moderate  Accelerations present   Decelerations not present     CONTRACTIONS: Contractions every 1-4 minutes.   Pitocin- 6 mu/min.,  Antibiotics- none    ROM: not ruptured  PELVIC EXAM: deferred    # Pain Assessment:      " 2025     7:31 PM   Current Pain Score   Patient currently in pain? denies       FETAL HEART RATE ASSESSMENT:  Reviewed fetal monitoring strip on unit  EFM interpretation suggests: absence of concern for metabolic acidemia due to: presence of accelerations and moderate variability. EFM suggests no concern for interruption of the oxygen pathway..    Labor course:  25  0745 143/83  0930 COOK 80ml SVE 1cm/30%/-3/post/mod   1250 141/79 GHTN  1426 3/30%/-3  COOK in place  1432 pitocin 2mU  1820 pitocin 3mU  1933 Pit @6, Cook remains in place    ASSESSMENT:  ==============  Deisi TRINH Steffany [place]  29 year old  female  Estimated Date of Delivery: 2025  IUP @ 41w0d for induction of labor.  Indication: postdates. TOLAC   Fetal Heart Rate Tracing category one over the last 120 minutes  GBS- negative    Patient Active Problem List   Diagnosis    High-risk pregnancy    Vitamin D deficiency    History of  delivery planning TOLAC    Anemia during pregnancy in third trimester    Encounter for induction of labor    Iron deficiency anemia secondary to inadequate dietary iron intake    Gestational hypertension, third trimester          PLAN:  ===========  - Anticipate progress and NSVB.   - Reevaluate progress in 2 hours or sooner with a change in status.  - Encouraged frequent position changes to facilitate labor and fetal descent.  - Continue Pitocin  - Continue applying traction to Cook and monitor for expulsion      Clinically Significant Risk Factors Present on Admission         # Hyponatremia: Lowest Na = 134 mmol/L in last 2 days, will monitor as appropriate       # Hypoalbuminemia: Lowest albumin = 3.3 g/dL at 2025 10:14 AM, will monitor as appropriate     # Hypertension: Noted on problem list      # Anemia: based on hgb <11                    Beba Sanchez CNM

## 2025-01-29 NOTE — PROGRESS NOTES
"Labor Progress Note     SUBJECTIVE:  ==============  Deisi Jeter [place]  Estimated Date of Delivery: 2025  General appearance: uncomfortable with contractions- they are intensifying and pt requests epidural.  Support:  remains supportive at bedside.       OBJECTIVE:  ==============  VITALS  Blood pressure 132/75, temperature 98.6  F (37  C), temperature source Oral, resp. rate 20, height 1.55 m (5' 1.02\"), weight 71.7 kg (158 lb), last menstrual period 2024.  Patient Vitals for the past 24 hrs:   BP Temp Temp src Resp Height Weight   25 0151 132/75 98.6  F (37  C) Oral 20 -- --   25 2338 127/68 98  F (36.7  C) Oral -- -- --   25 2149 127/70 97.9  F (36.6  C) Oral -- -- --   25 1931 123/68 98.2  F (36.8  C) Oral 20 -- --   25 1900 123/71 98.4  F (36.9  C) Oral 16 -- --   25 1800 131/69 -- -- -- -- --   25 1700 135/77 98.4  F (36.9  C) Oral 16 -- --   25 1610 127/77 -- -- -- -- --   25 1455 135/83 -- -- -- -- --   25 1250 (!) 141/79 97.6  F (36.4  C) Oral 16 -- --   25 0830 120/65 -- -- -- -- --   25 0815 129/71 -- -- -- -- --   25 0800 127/73 98.6  F (37  C) Oral 16 -- --   25 0752 -- -- -- -- 1.55 m (5' 1.02\") 71.7 kg (158 lb)   25 0745 (!) 143/83 -- -- -- -- --       FETAL HEART RATE ASSESSMENT:  Baseline rate 140s, normal  Variability moderate  Accelerations present   Decelerations few early decels    CONTRACTIONS: Contractions every 1-4 minutes.   Pitocin- 4 mu/min.,  Antibiotics- none    ROM: not ruptured  PELVIC EXAM: deferred until after epidural    # Pain Assessment:      2025     5:34 AM   Current Pain Score   Patient currently in pain? yes       FETAL HEART RATE ASSESSMENT:  Reviewed fetal monitoring strip on unit  EFM interpretation suggests: absence of concern for metabolic acidemia due to: presence of accelerations and moderate variability. EFM suggests no concern for interruption " of the oxygen pathway..    Labor course:  25  0745 143/83  0930 COOK 80ml SVE 1cm/30%/-3/post/mod   1250 141/79 GHTN  1426 3/30%/-3  COOK in place  1432 pitocin 2mU  1820 pitocin 3mU  1933 Pit @6, Cook remains in place  2325 Cook removed with traction VE 3  0200 Pit decreased to 4 due to tachysystole per RN, 500ml LR bolus done  035/-2  0605 Requesting epidural    ASSESSMENT:  ==============  Deisi TRINH Steffany [place]  29 year old  female  Estimated Date of Delivery: 2025  IUP @ 41w1d for induction of labor.  Indication: postdates; TOLAC  Fetal Heart Rate Tracing category one over the last 120 minutes  GBS- negative  GHTN     Patient Active Problem List   Diagnosis    High-risk pregnancy    Vitamin D deficiency    History of  delivery planning TOLAC    Anemia during pregnancy in third trimester    Encounter for induction of labor    Iron deficiency anemia secondary to inadequate dietary iron intake    Gestational hypertension, third trimester          PLAN:  ===========  - Anticipate progress and NSVB.   - Reevaluate progress in 2 hours or sooner with a change in status.  - Encouraged frequent position changes to facilitate labor and fetal descent.  - Prep for epidural, plan to recheck VE once comfortable.       Clinically Significant Risk Factors Present on Admission         # Hyponatremia: Lowest Na = 134 mmol/L in last 2 days, will monitor as appropriate       # Hypoalbuminemia: Lowest albumin = 3.3 g/dL at 2025 10:14 AM, will monitor as appropriate     # Hypertension: Noted on problem list      # Anemia: based on hgb <11                    Beba Sanchez CNM

## 2025-01-29 NOTE — PROGRESS NOTES
"Labor Progress Note     An  was present  In Person throughout the visit today. After discussing my assessment and treatment recommendations including risks and benefits with the patient.  I asked the patient to convey what they understood about my assessment and recommendations to ensure understanding. The communication back from the patient, as relayed through the , confirmed understanding. Thepatient was also given time to have all questions answered.   name: Wilber,  ID number: Ashtyn      SUBJECTIVE:  ==============  Deisi Jeter [place]  Estimated Date of Delivery: 2025  General appearance: uncomfortable with contractions. Rating them 6/10 on pain scale.   Support:  remains supportive at bedside.       OBJECTIVE:  ==============  VITALS  Blood pressure 127/70, temperature 97.9  F (36.6  C), temperature source Oral, resp. rate 20, height 1.55 m (5' 1.02\"), weight 71.7 kg (158 lb), last menstrual period 2024.  Patient Vitals for the past 24 hrs:   BP Temp Temp src Resp Height Weight   25 2149 127/70 97.9  F (36.6  C) Oral -- -- --   25 1931 123/68 98.2  F (36.8  C) Oral 20 -- --   25 1900 123/71 98.4  F (36.9  C) Oral 16 -- --   25 1800 131/69 -- -- -- -- --   25 1700 135/77 98.4  F (36.9  C) Oral 16 -- --   25 1610 127/77 -- -- -- -- --   25 1455 135/83 -- -- -- -- --   25 1250 (!) 141/79 97.6  F (36.4  C) Oral 16 -- --   25 0830 120/65 -- -- -- -- --   25 0815 129/71 -- -- -- -- --   25 0800 127/73 98.6  F (37  C) Oral 16 -- --   25 0752 -- -- -- -- 1.55 m (5' 1.02\") 71.7 kg (158 lb)   25 0745 (!) 143/83 -- -- -- -- --       FETAL HEART RATE ASSESSMENT:  Baseline rate 140s, normal  Variability moderate  Accelerations present   Decelerations one late decel @2317- none since    CONTRACTIONS: Contractions every 1-5 minutes.  Pitocin- 7 mu/min.,  Antibiotics- " none    ROM: not ruptured  PELVIC EXAM: 6/30/-3 with BB during ctx, ballotable vtx, soft, posterior. Cook removed with traction prior to exam    # Pain Assessment:      2025    11:00 PM   Current Pain Score   Patient currently in pain? yes       FETAL HEART RATE ASSESSMENT:  Reviewed fetal monitoring strip on unit  EFM interpretation suggests: absence of concern for metabolic acidemia due to: presence of accelerations and moderate variability. EFM suggests no concern for interruption of the oxygen pathway..    The interventions currently taken to improve the fetal heart rate tracing and fetal oxygenation are: evaluate labor progress    Labor course:  25  0745 143/83  0930 COOK 80ml SVE 1cm/30%/-3/post/mod   1250 141/79 GHTN  1426 3/30%/-3  COOK in place  1432 pitocin 2mU  1820 pitocin 3mU  1933 Pit @6, Cook remains in place  2325 Cook removed with traction VE 6/30/-3    ASSESSMENT:  ==============  Deisi TRINH Steffany [place]  29 year old  female  Estimated Date of Delivery: 2025  IUP @ 41w0d for induction of labor.  Indication: postdates; TOLAC  Fetal Heart Rate Tracing category one over the last 120 minutes other than an isolated late decel  GBS- negative    Patient Active Problem List   Diagnosis    High-risk pregnancy    Vitamin D deficiency    History of  delivery planning TOLAC    Anemia during pregnancy in third trimester    Encounter for induction of labor    Iron deficiency anemia secondary to inadequate dietary iron intake    Gestational hypertension, third trimester          PLAN:  ===========  - Anticipate progress and NSVB.   - Reevaluate progress in 2 hours or sooner with a change in status.  - Encouraged frequent position changes to facilitate labor and fetal descent.  - Continue Pitocin, AROM prn, epidural when pt desires.       Clinically Significant Risk Factors Present on Admission         # Hyponatremia: Lowest Na = 134 mmol/L in last 2 days, will monitor as appropriate        # Hypoalbuminemia: Lowest albumin = 3.3 g/dL at 1/28/2025 10:14 AM, will monitor as appropriate     # Hypertension: Noted on problem list      # Anemia: based on hgb <11                    Beba Sanchez CNM

## 2025-01-29 NOTE — ANESTHESIA PROCEDURE NOTES
Epidural catheter Procedure Note    Pre-Procedure   Staff -        Anesthesiologist:  Corrie Chin MD       Resident/Fellow: Wiley Lake MD       Performed By: resident and with residents       Procedure performed by resident/fellow/CRNA in presence of a teaching physician.         Location: OB       Pre-Anesthestic Checklist: patient identified, IV checked, risks and benefits discussed, informed consent, monitors and equipment checked, pre-op evaluation, at physician/surgeon's request and post-op pain management  Timeout:       Correct Patient: Yes        Correct Procedure: Yes        Correct Site: Yes        Correct Position: Yes   Procedure Documentation  Procedure: epidural catheter         Diagnosis: labor analgesia       Patient Position: sitting       Patient Prep/Sterile Barriers: sterile gloves, mask, patient draped       Skin prep: Chloraprep       Local skin infiltrated with mL of 2% lidocaine.        Insertion Site: L3-4. (midline approach).       Technique: LORT saline        YAS at 5 cm.       Needle Type: ToStop Being Watchedy needle       Needle Gauge: 17.        Needle Length (Inches): 3.5        Catheter: 19 G.          Catheter threaded easily.         10 cm epidural space.         Threaded 10 cm at skin.         # of attempts: 1 and  # of redirects:  1    Assessment/Narrative         Paresthesias: No.       Test dose of 3 mL lidocaine 1.5% w/ 1:200,000 epinephrine at 06:30 CST.         Test dose negative, 3 minutes after injection, for signs of intravascular, subdural, or intrathecal injection.       Insertion/Infusion Method: LORT saline       Aspiration negative for Heme or CSF via Epidural Catheter.       Sensory Level Left: T10.       Sensory Level Right: T10.    Medication(s) Administered   0.125% bupivacaine (Epidural) - EPIDURAL   6 mL - 1/29/2025 6:36:00 AM   Comments:  Routine labor epidural placement without complications.      FOR [facility] ([facility] [place]) ONLY:   Pain Team Contact information:  "please page the Pain Team Via [facility]. Search \"Pain\". During daytime hours, please page the attending first. At night please page the resident first.      "

## 2025-01-29 NOTE — PLAN OF CARE
Goal Outcome Evaluation:  Pt denies, headache vision changes; normal reflexes. Baby cat 1, ctx's 2-3 minutes apart pitocin up to 6 mill units. Eating and drinking  well. Frequent position changes also on birth ball; now trying to rest. States feeling contractions not too painful. Declines hot packs for now. Comfort provided. Cares explained, questions were answered.  at bedside.

## 2025-01-29 NOTE — PLAN OF CARE
VSS. A-febrile. Patient denies signs and symptoms if pre-e. Patient denies leaking of fluid and bleeding. See flowsheets for fetal monitoring. Baby strip category 1. Patient doing well with breathing through contractions and doing multiple position changes and ambulating in room. Patient requested epidural near end of shift and it was placed at 0632. Tolerated well.  at bedside. Continue with care of plan and expect .

## 2025-01-29 NOTE — PROGRESS NOTES
"Labor Progress Note     SUBJECTIVE:  ==============  Deisi Jeter [place]  Estimated Date of Delivery: 2025    RN has been frequently repositioning pt to facilitate fetal rotation and descent.     No HA, vision changes, ruq/epigastric pain.      OBJECTIVE:  ==============  VITALS  Blood pressure 131/71, temperature 98.3  F (36.8  C), temperature source Oral, resp. rate 17, height 1.55 m (5' 1.02\"), weight 71.7 kg (158 lb), last menstrual period 2024, SpO2 99%.  Patient Vitals for the past 24 hrs:   BP Temp Temp src Resp SpO2   25 1438 131/71 98.3  F (36.8  C) Oral 17 99 %   25 1340 123/65 -- -- -- 97 %   25 1238 130/69 -- -- 15 96 %   25 1139 127/65 98.4  F (36.9  C) Oral 16 97 %   25 0945 131/67 98.1  F (36.7  C) Oral 16 98 %   25 0831 139/82 -- -- -- 99 %   25 0801 136/77 -- -- -- 98 %   25 0731 129/69 98.3  F (36.8  C) Oral 16 97 %   25 0726 127/64 -- -- -- 97 %   25 0721 124/63 -- -- -- 95 %   25 0717 133/64 -- -- -- --   25 0716 -- -- -- -- 97 %   25 0713 130/60 -- -- -- --   25 0705 130/60 -- -- -- 97 %   25 0700 129/63 -- -- 20 97 %   25 0655 126/66 -- -- -- 97 %   25 0650 121/63 -- -- -- 98 %   25 0648 131/64 -- -- -- --   25 0646 125/60 -- -- -- --   25 0644 124/62 -- -- -- --   25 0643 126/65 -- -- -- --   25 0641 128/67 -- -- -- 99 %   25 0639 136/67 -- -- -- --   25 0637 137/69 -- -- -- --   25 0603 138/74 98  F (36.7  C) Oral 20 --   25 0151 132/75 98.6  F (37  C) Oral 20 --   25 2338 127/68 98  F (36.7  C) Oral -- --   25 2149 127/70 97.9  F (36.6  C) Oral -- --   25 1931 123/68 98.2  F (36.8  C) Oral 20 --   25 1900 123/71 98.4  F (36.9  C) Oral 16 --   25 1800 131/69 -- -- -- --   25 1700 135/77 98.4  F (36.9  C) Oral 16 --   25 1610 127/77 -- -- -- --       FETAL HEART RATE " ASSESSMENT:  Baseline rate 155, normal  Variability moderate  Accelerations present   Decelerations not present       CONTRACTIONS: every 2-3 minutes.  Palpate: strong  Pitocin- 14 mu/min.,  Antibiotics- none    ROM: not ruptured  PELVIC EXAM:deferred    # Pain Assessment:      2025     2:38 PM   Current Pain Score   Patient currently in pain? yes   Comfortable with epidural, feels some pressure      FETAL HEART RATE ASSESSMENT:  Reviewed fetal monitoring strip on unit  EFM interpretation suggests: absence of concern for metabolic acidemia due to: presence of accelerations and moderate variability. EFM suggests no concern for interruption of the oxygen pathway..      Labor course:    Labor course:  25  0745 143/83  0930 COOK 80ml SVE 1cm/30%/-3/post/mod   1250 141/79 GHTN  1426 3/30%/-3  COOK in place  1432 pitocin 2mU  1820 pitocin 3mU  1933 Pit @6, Cook remains in place  2325 Cook removed with traction VE 30/-3  0200 Pit decreased to 4 due to tachysystole per RN, 500ml LR bolus done  0351 6/60/-2  0605 Pt requests epidural  1019- 6.5/50/-3, Head not applied, ballotable, BOW taut with contraction      ASSESSMENT:  ==============  Deisi TRINH Steffany [place]  29 year old  female  Estimated Date of Delivery: 2025  IUP @ 41w1d for induction of labor.  Indication: late term, now with ghtn   Fetal Heart Rate Tracing category one   GBS- negative    GHTN  Hx c/s- desires tolac  Patient Active Problem List   Diagnosis    High-risk pregnancy    Vitamin D deficiency    History of  delivery planning TOLAC    Anemia during pregnancy in third trimester    Encounter for induction of labor    Iron deficiency anemia secondary to inadequate dietary iron intake    Gestational hypertension, third trimester    Labor and delivery indication for care or intervention          PLAN:  ===========  Continue position changes.   Will plan SVE and AROM in the next 2 hours.   Pt agreeable.         Geno  DOUG Estrada      An  was present  In Person throughout the visit today. After discussing my assessment and treatment recommendations including risks and benefits with the patient, and family.  I asked the patient, and family to convey what they understood about my assessment and recommendations to ensure understanding. The communication back from the patient, and family, as relayed through the , confirmed understanding. Thepatient, and family was also given time to have all questions answered.   name: Rosy [id number]

## 2025-01-29 NOTE — PROGRESS NOTES
"Labor Progress Note     SUBJECTIVE:  ==============  Deisi Carl  Estimated Date of Delivery: 2025  Deisi is sitting on the birth ball and is eating dinner. Feeling some more cramping but overall is comfortable  Denies HA, visual changes, RUQ pain  General appearance: comfortable  Support: Marvin is home helping with their son      OBJECTIVE:  ==============  VITALS  Blood pressure 131/69, temperature 98.4  F (36.9  C), temperature source Oral, resp. rate 16, height 1.55 m (5' 1.02\"), weight 71.7 kg (158 lb), last menstrual period 2024.  Patient Vitals for the past 24 hrs:   BP Temp Temp src Resp Height Weight   25 1800 131/69 -- -- -- -- --   25 1700 135/77 98.4  F (36.9  C) Oral 16 -- --   25 1610 127/77 -- -- -- -- --   25 1455 135/83 -- -- -- -- --   25 1250 (!) 141/79 97.6  F (36.4  C) Oral 16 -- --   25 0830 120/65 -- -- -- -- --   25 0815 129/71 -- -- -- -- --   25 0800 127/73 98.6  F (37  C) Oral 16 -- --   25 0752 -- -- -- -- 1.55 m (5' 1.02\") 71.7 kg (158 lb)   25 0745 (!) 143/83 -- -- -- -- --       FETAL HEART RATE ASSESSMENT:  Baseline rate 130, normal  Variability moderate  Accelerations present   Decelerations not present       CONTRACTIONS: Contractions every 2-3 minutes.  Palpate: moderate  Pitocin- 3 mu/min.,  Antibiotics- none    ROM: not ruptured  PELVIC EXAM:deferred    # Pain Assessment:      2025     6:00 PM   Current Pain Score   Patient currently in pain? yes   Patel pain level was assessed and she currently denies significant pain.  Feeling her contractions low in her back and low in the front      FETAL HEART RATE ASSESSMENT:  Reviewed fetal monitoring strip at bedside  EFM interpretation suggests: absence of concern for metabolic acidemia due to: presence of accelerations and moderate variability. EFM suggests no concern for interruption of the oxygen pathway..      Labor " course:  25  0745 143/83  0930 COOK 80ml SVE 1cm/30%/-3/post/mod   1250 141/79 GHTN  1426 3/30%/-3  COOK in place  1432 pitocin 2mU  1820 pitocin 3mU      ASSESSMENT:  ==============  Deisi TRINH Steffany [place]  29 year old  female  Estimated Date of Delivery: 2025  IUP @ 41w0d for induction of labor.  Indication: prolonged pregnancy  TOLAC  Early labor  Fetal Heart Rate Tracing category one over the last 120 minutes  GBS- negative  COOK in place  Anemic Hgb 10.8    Patient Active Problem List   Diagnosis    High-risk pregnancy    Vitamin D deficiency    History of  delivery planning TOLAC    Anemia during pregnancy in third trimester    Encounter for induction of labor    Iron deficiency anemia secondary to inadequate dietary iron intake    Gestational hypertension, third trimester          PLAN:  ===========  - Reviewed with Deisi Sanchez CNM will now assume care  - Reviewed plan to continue with pitocin IOL to achieve strong regular contractions  - Anticipate progress and .   - Reevaluate progress in 2 hours or sooner with a change in status.  - Encouraged frequent position changes to facilitate labor and fetal descent.  - Gestational hypertension  - MD team aware of TOLAC status    Clinically Significant Risk Factors Present on Admission         # Hyponatremia: Lowest Na = 134 mmol/L in last 2 days, will monitor as appropriate       # Hypoalbuminemia: Lowest albumin = 3.3 g/dL at 2025 10:14 AM, will monitor as appropriate     # Hypertension: Noted on problem list      # Anemia: based on hgb <11                    Irma Yoon CNM

## 2025-01-29 NOTE — PLAN OF CARE
Plan of care reviewed with : Deisi and her  Kwaku.    Overall Care: Progressing    Goal Outcome: Deisi is here for IOL in the setting of post dates and TOLAC. SVE /-3/posterior/medium at 0928. COOK balloon placed with uterine balloon infused to 80 ml. SVE 3/80/-3/posterior/medium with titus score 6 at 1408. Pitocin started at 2 units at 1432. She is currently at 4 units, last increased at 1805. COOK balloon still intact.  Traction applied to balloon every 30-60 minutes.    She is diagnosed with GHTN as she meets criteria after two high BP four hours apart today. She denies s/s of preeclampsia. DTRs are WNL. Strict I&O maintained. VSS. C-EFM as per flowsheet.     Deisi is feeling the contractions but coping well through breathing exercise and birthing ball. She is well informed and educated on pain management options. She is up as tolerated in the room, using the birthing ball and changing position frequently. Continue to provide labor support. Anticipate . Report given and care transferred to HUMBERTO Culver at 1920

## 2025-01-29 NOTE — PROGRESS NOTES
"Labor Progress Note     SUBJECTIVE:  ==============  Deisi Jeter [place]  Estimated Date of Delivery: 2025    General appearance: comfortable, Pt in throne position.   Rn has been actively changing pt's position, performed rebozo  Comfortable with epidural    No HA, vision changes, ruq/epigastric pain    Support: Partner Marvin      OBJECTIVE:  ==============  VITALS  Blood pressure 127/65, temperature 98.1  F (36.7  C), temperature source Oral, resp. rate 16, height 1.55 m (5' 1.02\"), weight 71.7 kg (158 lb), last menstrual period 2024, SpO2 97%.  Patient Vitals for the past 24 hrs:   BP Temp Temp src Resp SpO2   25 1139 127/65 -- -- 16 97 %   25 0945 131/67 98.1  F (36.7  C) Oral 16 98 %   25 0831 139/82 -- -- -- 99 %   25 0801 136/77 -- -- -- 98 %   25 0731 129/69 98.3  F (36.8  C) Oral 16 97 %   25 0726 127/64 -- -- -- 97 %   25 0721 124/63 -- -- -- 95 %   25 0717 133/64 -- -- -- --   25 0716 -- -- -- -- 97 %   25 0713 130/60 -- -- -- --   25 0705 130/60 -- -- -- 97 %   25 0700 129/63 -- -- 20 97 %   25 0655 126/66 -- -- -- 97 %   25 0650 121/63 -- -- -- 98 %   25 0648 131/64 -- -- -- --   25 0646 125/60 -- -- -- --   25 0644 124/62 -- -- -- --   25 0643 126/65 -- -- -- --   25 0641 128/67 -- -- -- 99 %   25 0639 136/67 -- -- -- --   25 0637 137/69 -- -- -- --   25 0603 138/74 98  F (36.7  C) Oral 20 --   25 0151 132/75 98.6  F (37  C) Oral 20 --   25 2338 127/68 98  F (36.7  C) Oral -- --   25 2149 127/70 97.9  F (36.6  C) Oral -- --   25 1931 123/68 98.2  F (36.8  C) Oral 20 --   25 1900 123/71 98.4  F (36.9  C) Oral 16 --   25 1800 131/69 -- -- -- --   25 1700 135/77 98.4  F (36.9  C) Oral 16 --   25 1610 127/77 -- -- -- --   25 1455 135/83 -- -- -- --   25 1250 (!) 141/79 97.6  F (36.4  C) Oral 16 " --       FETAL HEART RATE ASSESSMENT:  Baseline rate 150, normal  Variability moderate  Accelerations present   Decelerations not present      CONTRACTIONS: Contractions every 2-4 minutes.  Palpate: moderate  Pitocin- 12 mu/min.,  Antibiotics- none    ROM: not ruptured  PELVIC EXAM: 6.5/ 50%/ Posterior/ soft/ -3, ballotable; cervix soft and stretchy in between contractions; with contraction, taut BOW    Head not well applied to cervix     # Pain Assessment:      2025    11:39 AM   Current Pain Score   Patient currently in pain? yes   Deisi titus pain level was assessed and she currently denies pain.        FETAL HEART RATE ASSESSMENT:  Reviewed fetal monitoring strip at bedside  EFM interpretation suggests: absence of concern for metabolic acidemia due to: presence of accelerations and moderate variability. EFM suggests no concern for interruption of the oxygen pathway..    The interventions currently taken to improve the fetal heart rate tracing and fetal oxygenation are:     Labor course:  25  0745 143/83  0930 COOK 80ml SVE 1cm/30%/-3/post/mod   1250 141/79 GHTN  1426 3/30%/-3  COOK in place  1432 pitocin 2mU  1820 pitocin 3mU  1933 Pit @6, Cook remains in place  2325 Cook removed with traction VE /-3  0200 Pit decreased to 4 due to tachysystole per RN, 500ml LR bolus done  0351 60/-2  0605 Requesting epidural  1020 6.5/50/-3    ASSESSMENT:  ==============  Deisi Jeter [place]  29 year old  female  Estimated Date of Delivery: 2025  IUP @ 41w1d for induction of labor.  Indication: late term; now with gestational hypertension   Fetal Heart Rate Tracing category one over the last 60 minutes  GBS- negative    GHTN- normotensive to mild range, asymptomatic  Hx c/s- desires TOLAC    Patient Active Problem List   Diagnosis    High-risk pregnancy    Vitamin D deficiency    History of  delivery planning TOLAC    Anemia during pregnancy in third trimester    Encounter for induction of  labor    Iron deficiency anemia secondary to inadequate dietary iron intake    Gestational hypertension, third trimester    Labor and delivery indication for care or intervention          PLAN:  ===========  - Encouraged frequent position changes to facilitate labor and fetal descent.  - Not appropriate for AROM at this time. Will continue pitocin titration to adequate contraction pattern.   Consider AROM at upcoming exam if appropriate. Discussed risks & benefits of AROM, possibilty of a controlled rupture to aid fetal descent.   - Reevaluate prn per maternal/fetal indications.       Meaghan Barraza, CARINN RN Student Nurse Midwife  RODNEY, Meaghan Barraza, am serving as a scribe; to document services personally performed by  Geno RAMOS CNM based on data collection and the provider's statements to me.     Meaghan Barraza    The encounter was performed by me and scribed by the SNM. The scribed note accurately reflects my personal services and decisions made by me.     LAURIE Esparza, DOUG     An  was present  In Person throughout the visit today. After discussing my assessment and treatment recommendations including risks and benefits with the patient, and family.  I asked the patient, and family to convey what they understood about my assessment and recommendations to ensure understanding. The communication back from the patient, and family, as relayed through the , confirmed understanding. Thepatient, and family was also given time to have all questions answered.   name: Courtney Taylor ID number: [id number]

## 2025-01-30 ENCOUNTER — OFFICE VISIT (OUTPATIENT)
Dept: INTERPRETER SERVICES | Facility: CLINIC | Age: 30
End: 2025-01-30
Payer: COMMERCIAL

## 2025-01-30 ENCOUNTER — VIRTUAL VISIT (OUTPATIENT)
Dept: INTERPRETER SERVICES | Facility: CLINIC | Age: 30
End: 2025-01-30
Payer: COMMERCIAL

## 2025-01-30 VITALS
DIASTOLIC BLOOD PRESSURE: 76 MMHG | TEMPERATURE: 98.1 F | HEART RATE: 93 BPM | SYSTOLIC BLOOD PRESSURE: 101 MMHG | OXYGEN SATURATION: 99 % | BODY MASS INDEX: 28.51 KG/M2 | HEIGHT: 61 IN | RESPIRATION RATE: 15 BRPM | WEIGHT: 151 LBS

## 2025-01-30 LAB
ALT SERPL W P-5'-P-CCNC: 16 U/L (ref 0–50)
AST SERPL W P-5'-P-CCNC: 34 U/L (ref 0–45)
BLD PROD TYP BPU: NORMAL
BLOOD COMPONENT TYPE: NORMAL
CODING SYSTEM: NORMAL
CREAT SERPL-MCNC: 0.79 MG/DL (ref 0.51–0.95)
CROSSMATCH: NORMAL
EGFRCR SERPLBLD CKD-EPI 2021: >90 ML/MIN/1.73M2
HGB BLD-MCNC: 7 G/DL (ref 11.7–15.7)
HGB BLD-MCNC: 7.4 G/DL (ref 11.7–15.7)
HGB BLD-MCNC: 8.6 G/DL (ref 11.7–15.7)
ISSUE DATE AND TIME: NORMAL
PLATELET # BLD AUTO: 134 10E3/UL (ref 150–450)
UNIT ABO/RH: NORMAL
UNIT NUMBER: NORMAL
UNIT STATUS: NORMAL
UNIT TYPE ISBT: 5100

## 2025-01-30 PROCEDURE — 84460 ALANINE AMINO (ALT) (SGPT): CPT

## 2025-01-30 PROCEDURE — 36415 COLL VENOUS BLD VENIPUNCTURE: CPT

## 2025-01-30 PROCEDURE — 36415 COLL VENOUS BLD VENIPUNCTURE: CPT | Performed by: STUDENT IN AN ORGANIZED HEALTH CARE EDUCATION/TRAINING PROGRAM

## 2025-01-30 PROCEDURE — 258N000003 HC RX IP 258 OP 636: Performed by: STUDENT IN AN ORGANIZED HEALTH CARE EDUCATION/TRAINING PROGRAM

## 2025-01-30 PROCEDURE — 250N000011 HC RX IP 250 OP 636

## 2025-01-30 PROCEDURE — 99232 SBSQ HOSP IP/OBS MODERATE 35: CPT | Mod: GC | Performed by: STUDENT IN AN ORGANIZED HEALTH CARE EDUCATION/TRAINING PROGRAM

## 2025-01-30 PROCEDURE — 85049 AUTOMATED PLATELET COUNT: CPT

## 2025-01-30 PROCEDURE — 84450 TRANSFERASE (AST) (SGOT): CPT

## 2025-01-30 PROCEDURE — 85018 HEMOGLOBIN: CPT | Performed by: STUDENT IN AN ORGANIZED HEALTH CARE EDUCATION/TRAINING PROGRAM

## 2025-01-30 PROCEDURE — 250N000013 HC RX MED GY IP 250 OP 250 PS 637

## 2025-01-30 PROCEDURE — 82565 ASSAY OF CREATININE: CPT

## 2025-01-30 PROCEDURE — T1013 SIGN LANG/ORAL INTERPRETER: HCPCS

## 2025-01-30 PROCEDURE — 85018 HEMOGLOBIN: CPT

## 2025-01-30 PROCEDURE — 120N000002 HC R&B MED SURG/OB UMMC

## 2025-01-30 PROCEDURE — P9016 RBC LEUKOCYTES REDUCED: HCPCS

## 2025-01-30 RX ORDER — SODIUM PHOSPHATE,MONO-DIBASIC 19G-7G/118
1 ENEMA (ML) RECTAL DAILY PRN
Status: ACTIVE | OUTPATIENT
Start: 2025-01-31

## 2025-01-30 RX ORDER — OXYCODONE HYDROCHLORIDE 5 MG/1
5 TABLET ORAL EVERY 4 HOURS PRN
Status: DISPENSED | OUTPATIENT
Start: 2025-01-30

## 2025-01-30 RX ORDER — DEXTROSE, SODIUM CHLORIDE, SODIUM LACTATE, POTASSIUM CHLORIDE, AND CALCIUM CHLORIDE 5; .6; .31; .03; .02 G/100ML; G/100ML; G/100ML; G/100ML; G/100ML
INJECTION, SOLUTION INTRAVENOUS CONTINUOUS
Status: ACTIVE | OUTPATIENT
Start: 2025-01-30

## 2025-01-30 RX ORDER — SIMETHICONE 80 MG
80 TABLET,CHEWABLE ORAL 4 TIMES DAILY PRN
Status: ACTIVE | OUTPATIENT
Start: 2025-01-30

## 2025-01-30 RX ORDER — ENOXAPARIN SODIUM 100 MG/ML
40 INJECTION SUBCUTANEOUS EVERY 24 HOURS
Status: DISPENSED | OUTPATIENT
Start: 2025-01-30

## 2025-01-30 RX ORDER — NALOXONE HYDROCHLORIDE 0.4 MG/ML
0.4 INJECTION, SOLUTION INTRAMUSCULAR; INTRAVENOUS; SUBCUTANEOUS
Status: ACTIVE | OUTPATIENT
Start: 2025-01-30

## 2025-01-30 RX ORDER — METHYLERGONOVINE MALEATE 0.2 MG/ML
200 INJECTION INTRAVENOUS
Status: ACTIVE | OUTPATIENT
Start: 2025-01-30

## 2025-01-30 RX ORDER — MODIFIED LANOLIN
OINTMENT (GRAM) TOPICAL
Status: ACTIVE | OUTPATIENT
Start: 2025-01-30

## 2025-01-30 RX ORDER — METOCLOPRAMIDE HYDROCHLORIDE 5 MG/ML
10 INJECTION INTRAMUSCULAR; INTRAVENOUS EVERY 6 HOURS PRN
Status: ACTIVE | OUTPATIENT
Start: 2025-01-30

## 2025-01-30 RX ORDER — ONDANSETRON 4 MG/1
4 TABLET, ORALLY DISINTEGRATING ORAL EVERY 6 HOURS PRN
Status: ACTIVE | OUTPATIENT
Start: 2025-01-30

## 2025-01-30 RX ORDER — HYDROCORTISONE 25 MG/G
CREAM TOPICAL 3 TIMES DAILY PRN
Status: ACTIVE | OUTPATIENT
Start: 2025-01-30

## 2025-01-30 RX ORDER — AMOXICILLIN 250 MG
1 CAPSULE ORAL 2 TIMES DAILY
Status: ACTIVE | OUTPATIENT
Start: 2025-01-30

## 2025-01-30 RX ORDER — DIPHENHYDRAMINE HYDROCHLORIDE 50 MG/ML
25 INJECTION INTRAMUSCULAR; INTRAVENOUS EVERY 6 HOURS PRN
Status: ACTIVE | OUTPATIENT
Start: 2025-01-30

## 2025-01-30 RX ORDER — IBUPROFEN 800 MG/1
800 TABLET, FILM COATED ORAL EVERY 6 HOURS
Status: DISPENSED | OUTPATIENT
Start: 2025-01-30

## 2025-01-30 RX ORDER — MISOPROSTOL 200 UG/1
400 TABLET ORAL
Status: ACTIVE | OUTPATIENT
Start: 2025-01-30

## 2025-01-30 RX ORDER — ACETAMINOPHEN 325 MG/1
975 TABLET ORAL EVERY 6 HOURS
Status: DISPENSED | OUTPATIENT
Start: 2025-01-30

## 2025-01-30 RX ORDER — LOPERAMIDE HYDROCHLORIDE 2 MG/1
4 CAPSULE ORAL
Status: ACTIVE | OUTPATIENT
Start: 2025-01-30

## 2025-01-30 RX ORDER — CARBOPROST TROMETHAMINE 250 UG/ML
250 INJECTION, SOLUTION INTRAMUSCULAR
Status: ACTIVE | OUTPATIENT
Start: 2025-01-30

## 2025-01-30 RX ORDER — OXYTOCIN 10 [USP'U]/ML
10 INJECTION, SOLUTION INTRAMUSCULAR; INTRAVENOUS
Status: ACTIVE | OUTPATIENT
Start: 2025-01-30

## 2025-01-30 RX ORDER — TRANEXAMIC ACID 10 MG/ML
1 INJECTION, SOLUTION INTRAVENOUS EVERY 30 MIN PRN
Status: ACTIVE | OUTPATIENT
Start: 2025-01-30

## 2025-01-30 RX ORDER — AMOXICILLIN 250 MG
2 CAPSULE ORAL 2 TIMES DAILY
Status: DISPENSED | OUTPATIENT
Start: 2025-01-30

## 2025-01-30 RX ORDER — ENOXAPARIN SODIUM 100 MG/ML
40 INJECTION SUBCUTANEOUS EVERY 24 HOURS
Status: DISCONTINUED | OUTPATIENT
Start: 2025-01-30 | End: 2025-01-30

## 2025-01-30 RX ORDER — METOCLOPRAMIDE 10 MG/1
10 TABLET ORAL EVERY 6 HOURS PRN
Status: ACTIVE | OUTPATIENT
Start: 2025-01-30

## 2025-01-30 RX ORDER — NALOXONE HYDROCHLORIDE 0.4 MG/ML
0.2 INJECTION, SOLUTION INTRAMUSCULAR; INTRAVENOUS; SUBCUTANEOUS
Status: ACTIVE | OUTPATIENT
Start: 2025-01-30

## 2025-01-30 RX ORDER — OXYTOCIN/0.9 % SODIUM CHLORIDE 30/500 ML
340 PLASTIC BAG, INJECTION (ML) INTRAVENOUS CONTINUOUS PRN
Status: ACTIVE | OUTPATIENT
Start: 2025-01-30

## 2025-01-30 RX ORDER — PROCHLORPERAZINE MALEATE 10 MG
10 TABLET ORAL EVERY 6 HOURS PRN
Status: ACTIVE | OUTPATIENT
Start: 2025-01-30

## 2025-01-30 RX ORDER — KETOROLAC TROMETHAMINE 30 MG/ML
30 INJECTION, SOLUTION INTRAMUSCULAR; INTRAVENOUS EVERY 6 HOURS
Status: COMPLETED | OUTPATIENT
Start: 2025-01-30 | End: 2025-01-30

## 2025-01-30 RX ORDER — DIPHENHYDRAMINE HCL 25 MG
25 CAPSULE ORAL EVERY 6 HOURS PRN
Status: ACTIVE | OUTPATIENT
Start: 2025-01-30

## 2025-01-30 RX ORDER — LIDOCAINE 40 MG/G
CREAM TOPICAL
Status: ACTIVE | OUTPATIENT
Start: 2025-01-30

## 2025-01-30 RX ORDER — BISACODYL 10 MG
10 SUPPOSITORY, RECTAL RECTAL DAILY PRN
Status: ACTIVE | OUTPATIENT
Start: 2025-01-31

## 2025-01-30 RX ORDER — MISOPROSTOL 200 UG/1
800 TABLET ORAL
Status: ACTIVE | OUTPATIENT
Start: 2025-01-30

## 2025-01-30 RX ORDER — LOPERAMIDE HYDROCHLORIDE 2 MG/1
2 CAPSULE ORAL
Status: ACTIVE | OUTPATIENT
Start: 2025-01-30

## 2025-01-30 RX ORDER — SODIUM CHLORIDE 9 MG/ML
INJECTION, SOLUTION INTRAVENOUS
Status: COMPLETED
Start: 2025-01-30 | End: 2025-01-30

## 2025-01-30 RX ORDER — ONDANSETRON 2 MG/ML
4 INJECTION INTRAMUSCULAR; INTRAVENOUS EVERY 6 HOURS PRN
Status: ACTIVE | OUTPATIENT
Start: 2025-01-30

## 2025-01-30 RX ADMIN — KETOROLAC TROMETHAMINE 30 MG: 30 INJECTION, SOLUTION INTRAMUSCULAR at 04:20

## 2025-01-30 RX ADMIN — SODIUM CHLORIDE: 0.9 INJECTION, SOLUTION INTRAVENOUS at 14:08

## 2025-01-30 RX ADMIN — ACETAMINOPHEN 975 MG: 325 TABLET, FILM COATED ORAL at 10:05

## 2025-01-30 RX ADMIN — KETOROLAC TROMETHAMINE 30 MG: 30 INJECTION, SOLUTION INTRAMUSCULAR at 17:18

## 2025-01-30 RX ADMIN — OXYCODONE 5 MG: 5 TABLET ORAL at 20:56

## 2025-01-30 RX ADMIN — KETOROLAC TROMETHAMINE 30 MG: 30 INJECTION, SOLUTION INTRAMUSCULAR at 10:05

## 2025-01-30 RX ADMIN — ENOXAPARIN SODIUM 40 MG: 40 INJECTION SUBCUTANEOUS at 20:59

## 2025-01-30 RX ADMIN — SENNOSIDES AND DOCUSATE SODIUM 2 TABLET: 50; 8.6 TABLET ORAL at 10:05

## 2025-01-30 RX ADMIN — SENNOSIDES AND DOCUSATE SODIUM 1 TABLET: 50; 8.6 TABLET ORAL at 20:59

## 2025-01-30 RX ADMIN — ACETAMINOPHEN 975 MG: 325 TABLET, FILM COATED ORAL at 17:18

## 2025-01-30 RX ADMIN — ACETAMINOPHEN 975 MG: 325 TABLET, FILM COATED ORAL at 03:20

## 2025-01-30 ASSESSMENT — ACTIVITIES OF DAILY LIVING (ADL)
ADLS_ACUITY_SCORE: 24
ADLS_ACUITY_SCORE: 23
ADLS_ACUITY_SCORE: 25
ADLS_ACUITY_SCORE: 24
ADLS_ACUITY_SCORE: 25
ADLS_ACUITY_SCORE: 24
ADLS_ACUITY_SCORE: 25
ADLS_ACUITY_SCORE: 24
ADLS_ACUITY_SCORE: 25
ADLS_ACUITY_SCORE: 24
ADLS_ACUITY_SCORE: 25
ADLS_ACUITY_SCORE: 24
ADLS_ACUITY_SCORE: 25

## 2025-01-30 NOTE — PROGRESS NOTES
"Labor Progress Note     SUBJECTIVE:  ==============  Deisi Jeter [place]  Estimated Date of Delivery: 2025  General appearance: comfortable with epidural. Requests cervical exam.   Support: Marvin      OBJECTIVE:  ==============  VITALS  Blood pressure (!) 151/78, temperature 98.3  F (36.8  C), temperature source Oral, resp. rate 16, height 1.55 m (5' 1.02\"), weight 71.7 kg (158 lb), last menstrual period 2024, SpO2 97%.  Patient Vitals for the past 24 hrs:   BP Temp Temp src Resp SpO2   25 1901 (!) 151/78 -- -- 16 97 %   25 1845 (!) 153/80 -- -- -- 97 %   25 1828 (!) 148/76 -- -- -- 97 %   25 1823 (!) 142/78 -- -- -- 97 %   25 1818 (!) 153/82 -- -- -- 98 %   25 1812 (!) 150/75 -- -- 15 98 %   25 1807 (!) 161/81 -- -- -- 98 %   25 1806 -- -- -- -- 99 %   25 180 (!) 175/84 -- -- -- 98 %   25 138/71 -- -- -- 98 %   25 131/68 -- -- -- 98 %   25 -- -- -- -- 98 %   25 (!) 140/71 98.3  F (36.8  C) Oral 18 98 %   25 135/67 -- -- -- 98 %   25 131/70 -- -- -- 98 %   25 1743 (!) 143/75 -- -- -- 98 %   25 134/74 -- -- -- 98 %   01/29/25 1739 137/80 -- -- -- 98 %   25 1737 133/79 -- -- -- 98 %   25 1736 125/73 -- -- -- 98 %   25 1545 (!) 153/79 98.4  F (36.9  C) Oral 18 98 %   25 1438 131/71 98.3  F (36.8  C) Oral 17 99 %   25 1340 123/65 -- -- -- 97 %   25 1238 130/69 -- -- 15 96 %   25 1139 127/65 98.4  F (36.9  C) Oral 16 97 %   25 0945 131/67 98.1  F (36.7  C) Oral 16 98 %   25 0831 139/82 -- -- -- 99 %   25 0801 136/77 -- -- -- 98 %   25 0731 129/69 98.3  F (36.8  C) Oral 16 97 %   25 0726 127/64 -- -- -- 97 %   25 0721 124/63 -- -- -- 95 %   25 0717 133/64 -- -- -- --   25 0716 -- -- -- -- 97 %   25 0713 130/60 -- -- -- --   25 0705 130/60 -- -- -- 97 %   25 " 0700 129/63 -- -- 20 97 %   01/29/25 0655 126/66 -- -- -- 97 %   01/29/25 0650 121/63 -- -- -- 98 %   01/29/25 0648 131/64 -- -- -- --   01/29/25 0646 125/60 -- -- -- --   01/29/25 0644 124/62 -- -- -- --   01/29/25 0643 126/65 -- -- -- --   01/29/25 0641 128/67 -- -- -- 99 %   01/29/25 0639 136/67 -- -- -- --   01/29/25 0637 137/69 -- -- -- --   01/29/25 0603 138/74 98  F (36.7  C) Oral 20 --   01/29/25 0151 132/75 98.6  F (37  C) Oral 20 --   01/28/25 2338 127/68 98  F (36.7  C) Oral -- --   01/28/25 2149 127/70 97.9  F (36.6  C) Oral -- --   01/28/25 1931 123/68 98.2  F (36.8  C) Oral 20 --       FETAL HEART RATE ASSESSMENT:  Baseline rate 135, normal  Variability moderate  Accelerations present   Decelerations not present       CONTRACTIONS: Contractions every 2-4 minutes.  MVU's 130-145  Pitocin- 22 mu/min.,  Antibiotics- none    ROM: light meconium fluid   PELVIC EXAM:PELVIC EXAM: 6.5/ 70%/ Posterior/ average/ -2, unchanged from previous exam.     # Pain Assessment:      1/29/2025     5:49 PM   Current Pain Score   Patient currently in pain? yes   Patel pain level was assessed and she currently denies pain.        FETAL HEART RATE ASSESSMENT:  Reviewed fetal monitoring strip on unit  EFM interpretation suggests: absence of concern for metabolic acidemia due to: presence of accelerations and moderate variability. EFM suggests no concern for interruption of the oxygen pathway..    Labor course:  1/28/25  0745 143/83  0930 COOK 80ml SVE 1cm/30%/-3/post/mod   1250 141/79 GHTN  1426 3/30%/-3  COOK in place  1432 pitocin 2mU  1820 pitocin 3mU  1933 Pit @6, Cook remains in place  2325 Cook removed with traction VE 6/30/-3  0200 Pit decreased to 4 due to tachysystole per RN, 500ml LR bolus done  0351 6/60/-2  0605 Pt requests epidural  1019- 6.5/50/-3, Head not applied, ballotable, BOW taut with contraction  1432- 6/80/-2  1435-  AROM, medium meconium- plan NICU at delivery  1632- very swollen, 5cm, caput molding,  pt uncomfortable asking about c/s- IUPC  1730- anesthesia at bedside, OB resident at bedside  1800- MD discussed c/s, consent signed. Anesthesia bolused - now comfortable. Per MD, medical indication for c/s if unchanged at   1755- 50mg iv benadryl  180- severe range BP x 1, repeat 150/75- labs ordered   cervix unchanged, plan reassess       ASSESSMENT:  ==============  Deisi TRINH [place]ff[place]  29 year old  female  Estimated Date of Delivery: 2025  IUP @ 41w1d in active labor   Fetal Heart Rate Tracing category one over the last 90 minutes  GBS- negative  Ghtn - non-sustained severe range BP  Patient Active Problem List   Diagnosis    High-risk pregnancy    Vitamin D deficiency    History of  delivery planning TOLAC    Anemia during pregnancy in third trimester    Encounter for induction of labor    Iron deficiency anemia secondary to inadequate dietary iron intake    Gestational hypertension, third trimester    Labor and delivery indication for care or intervention          PLAN:  ===========  - Discussed r/b/a of continuing until  (which would be 6 hours of pitocin administration without cervical change) and reassessing cervix vs. Calling C/S for AoDil now. She prefers to wait and reassess at .   - Encouraged frequent position changes to facilitate labor and fetal descent.      LAURIE Sylvester CNM    An  was present (in-person/by phone/virtually) throughout the visit today. After discussing my assessment and treatment recommendations including risks and benefits with the patient/ patient s family, I asked the patient / family to convey what they understood about my assessment and recommendations to ensure understanding. The communication back from the patient/ family /parent, as relayed through the , confirmed understanding. The patient /family was also given time to have all questions answered.

## 2025-01-30 NOTE — PROGRESS NOTES
Brief progress note    Paged to bedside by DOUG Estrada to discuss the possibility of a  section with the patient, per patient request. The patient has been ruptured, on pitocin, with an unchanged cervix since 1430 (6.5//-2) so does not meet arrest of dilation criteria. There is no other indication for  delivery. Tracing is Category I with inadequate MVUs. Patient was counseled on the risks, benefits, and alternatives of a , and signed consent forms. She was advised that there is no indication for a  section; however the MD team can deliver via c/s per maternal request. The patient would like to continue to labor. At  if there is no cervical change, the patient will meet criteria for arrest of dilation and a  section may be clinically indicated. Will continue to follow    Rudy Kamara MD  Obstetrics and Gynecology, PGY-2  [phone]

## 2025-01-30 NOTE — ANESTHESIA CARE TRANSFER NOTE
Patient: Deisi Jeter [place]    Procedure: Procedure(s):   section       Diagnosis: * No pre-op diagnosis entered *  Diagnosis Additional Information: No value filed.    Anesthesia Type:   Epidural     Note:    Oropharynx: oropharynx clear of all foreign objects  Level of Consciousness: awake  Oxygen Supplementation: room air    Independent Airway: airway patency satisfactory and stable  Dentition: dentition unchanged  Vital Signs Stable: post-procedure vital signs reviewed and stable  Report to RN Given: handoff report given  Patient transferred to: PACU    Handoff Report: Identifed the Patient, Identified the Reponsible Provider, Reviewed the pertinent medical history, Discussed the surgical course, Reviewed Intra-OP anesthesia mangement and issues during anesthesia, Set expectations for post-procedure period and Allowed opportunity for questions and acknowledgement of understanding      Vitals:  Vitals Value Taken Time   /81 25 2330   Temp 36.9  C (98.5  F) 25 2255   Pulse 90 25 2333   Resp 16 25 2333   SpO2 96 % 25 2333   Vitals shown include unfiled device data.    Electronically Signed By: Sun Burdick DO  2025  11:33 PM

## 2025-01-30 NOTE — PLAN OF CARE
Goal Outcome Evaluation:    Afebrile, BP non-sustained SR BP this evening, otherwise, VSS. Labs ordered. She denies HA, blurry vision, RUQ pain and SOB. Reflexes 1+. Deisi was able to rest comfortably with epidural and frequent position changes. Epidural bolused x1 for pain, bolus successful with reducing discomfort. AROM meconium stained fluid @ 1435. Benadryl administered for swollen cervix @ 1755. Last SVE 6.5/70/-1 per CNM. See flowsheet for EFM and uterine activity. Per CNM plan to recheck cervix @ 2030. If no cervical change is made it would be recommended for a c/s. Report given to HUMBERTO Saavedra.

## 2025-01-30 NOTE — PROGRESS NOTES
Obstetrics Postpartum Progress Note  DOS: 01/30/25  MRN: [id number]    POD#1 after RLTCS     Due to language barrier, a  was present during the history-taking and subsequent discussion (and for part of the physical exam) with this patient.     S: Deisi states she is doing well this morning. Reports that she continues to have pain at the level of her incision that is worsened with ambulation, but states it improved with medications. Lochia minimal and appropriate. States she had some nausea overnight and so has not yet eaten a meal. Tolerated apple juice.She is  passing flatus. Ambulating without lightheadedness or dizziness. Has not yet voided since her roman was removed. Reports no headaches, vision changes, chest pain, SOB, or other systemic symptoms. Formula feeding at this time, but desires to breast feed once her milk comes in. No other acute concerns.  O:  Vitals:    01/30/25 0120 01/30/25 0215 01/30/25 0320 01/30/25 0425   BP: 133/79 130/64 132/74 132/76   BP Location: Right arm Right arm Right arm Right arm   Patient Position: Semi-Lemos's Semi-Lemos's Semi-Lemos's Semi-Lemos's   Cuff Size: Adult Regular Adult Regular Adult Regular Adult Regular   Pulse:       Resp: 15 14 15 15   Temp:    98.5  F (36.9  C)   TempSrc:    Oral   SpO2: 99% 99% 99% 100%   Weight:       Height:         Gen:  NAD, alert and oriented  CV: Regular rate, well perfused  Resp: Nonlabored on room air, normal inspiratory effort  Abd: soft, nondistended, non-tender, fundus firm at 1 cm below the umbilicus  Incision: Bandage in place with no saturation; no surrounding erythema or drainage  Ext: 1+ edema bilaterally; SCDs in place    Vitals:    01/28/25 0752   Weight: 71.7 kg (158 lb)     Labs:  Hemoglobin   Date Value Ref Range Status   01/29/2025 11.0 (L) 11.7 - 15.7 g/dL Final   01/28/2025 10.8 (L) 11.7 - 15.7 g/dL Final       Rubella Antibody IgG   Date Value Ref Range Status   07/22/2024 Positive  Final      Comment:     Suggests previous exposure or immunization and probable immunity.       Assessment and Plan: 29 year old  POD#1 s/p RLTCS, doing well postpartum. Pregnancy was notable for unsuccessful TOLAC and meeting criteria for gestational hypertension. She is doing well in the early postpartum period and starting to work towards meeting milestones for discharge to home.     gHTN  - AM HELLP labs not yet collected  - HELLP wnl on 2025  - No signs/symptoms of preeclampsia on review of systems or physical examination    Routine postpartum  Postpartum hemorrhage, ABLA  Heme: Hgb 11 > EBL 1462 > 7.4. No symptoms of ABLA. Will discharge home w/ PO iron. Will repeat Hgb at 1200, if stable and > 7, plan for IV Fe. Otherwise will give blood.  Pain: well-controlled with tylenol, ibuprofen and oxycodone prn, continue.  Rh +/Rubella immune. No intervention required.  Feeding: Formula feeding and working on breast feeding  :  s/p roman, voiding spontaneously  PPX: Encourage ambulation  Continue regular diet, scheduled bowel regimen, prn antiemetics  Contraception: Did not address this morning; will discuss prior to discharge.      Dispo: Anticipate discharge home POD#2-3    Kathi Valadez MD  Obstetrics and Gynecology, PGY-2  25 7:07 AM    OBGYN Attending Attestation    Deisi Carl was seen and examined by me separately from the team.  I have reviewed and agree with the above note by Dr. Valadez and have edited as necessary.    POD#1 from RLTCS for arrest of dilation and unsuccessful TOLAC. Delivery cwas complicated by postoperative hemorrhage, now with acute blood loss anemia. Will recheck Hgb at noon and give IV Fe if stable. If < 7, will give 1u pRBCs. Currently asymptomatic.    HELLP labs today normal, and BP normotensive without medication, will continue to monitor.    Susan Herbert MD  [facility], Ob/Gyn  2025 10:38 AM

## 2025-01-30 NOTE — PROVIDER NOTIFICATION
01/29/25 1823   Provider Notification   Provider Name/Title Geno Estrada CNM   Method of Notification In Department   Request Evaluate - Remote   Notification Reason Maternal Vital Sign Change     Non sustained severe range BP.    Provider ordered labs

## 2025-01-30 NOTE — PROGRESS NOTES
Patient arrived to [facility] unit via zoom cart at 1230am ,with belongings, accompanied by spouse/ significant other, with infant in arms. Received report from  Brandon PAUL  and checked bands. Unit and room orientation completd. Call light given and within arms reach; no concerns present at this time. Continue with plan of care.

## 2025-01-30 NOTE — PLAN OF CARE
Data: Deisi Carl transferred to [id number] via wheelchair at 00:25. Baby transferred via parent's arms.  Action: Receiving unit notified of transfer: Yes. Patient and family notified of room change. Report given to Whit SMART RN at 9610. Belongings sent to receiving unit. Accompanied by Registered Nurse. Oriented patient to surroundings. Call light within reach. ID bands double-checked with receiving RN.  Response: Patient tolerated transfer and is stable.

## 2025-01-30 NOTE — PROGRESS NOTES
"Labor progress note:     Subjective: Patient remains comfortable with the epidural.     Objective:   /63 (Patient Position: Left side, Cuff Size: Adult Regular)   Temp 98.9  F (37.2  C) (Oral)   Resp 16   Ht 1.55 m (5' 1.02\")   Wt 71.7 kg (158 lb)   LMP 2024   SpO2 100%   BMI 29.83 kg/m      Electronic Fetal Monitoring:  O: Baseline rate 150, normal  Variability moderate  Accelerations present  Decelerations not present    Assessment: Category I EFM interpretation suggests absence of concern for fetal metabolic acidemia at this time due to accelerations present and variability: moderate    Uterine Activity normal. Every 2-4 minutes, MVU's inadequate     Strip reviewed on unit    Cervix: unchanged from previous exam. IUPC in place      Assessment:  Arrest of dilation  Failed TOLAC    Plan:  - Reviewed recommendation for proceeding with  section given no cervical change. MD team aware and assuming care. Warm, bedside handoff with Dr. Sims.     LAURIE Sylvester CNM    An  was present (in-person/by phone/virtually) throughout the visit today. After discussing my assessment and treatment recommendations including risks and benefits with the patient/ patient s family, I asked the patient / family to convey what they understood about my assessment and recommendations to ensure understanding. The communication back from the patient/ family /parent, as relayed through the , confirmed understanding. The patient /family was also given time to have all questions answered.        "

## 2025-01-30 NOTE — ANESTHESIA PROCEDURE NOTES
TAP Procedure Note    Pre-Procedure   Staff -        Anesthesiologist:  Sarah Kirby MD       Resident/Fellow: Sun Burdick DO       Performed By: resident       Procedure Start/Stop Times: 1/29/2025 10:20 AM and 1/29/2025 10:25 AM       Pre-Anesthestic Checklist: patient identified, IV checked, site marked, risks and benefits discussed, informed consent, monitors and equipment checked, pre-op evaluation, at physician/surgeon's request and post-op pain management  Timeout:       Correct Patient: Yes        Correct Procedure: Yes        Correct Site: Yes        Correct Position: Yes        Correct Laterality: Yes        Site Marked: Yes  Procedure Documentation  Procedure: TAP         Diagnosis: POST-OP PAIN       Laterality: bilateral       Patient Position: supine       Skin prep: Chloraprep       Insertion Site: T9-10.       Needle Type: short bevel       Needle Gauge: 21.        Needle Length (millimeters): 110        Ultrasound guided       1. Ultrasound was used to identify targeted nerve, plexus, vascular marker, or fascial plane and place a needle adjacent to it in real-time.       2. Ultrasound was used to visualize the spread of anesthetic in close proximity to the above referenced structure.       3. A permanent image is entered into the patient's record.    Assessment/Narrative         The placement was negative for: blood aspirated, painful injection and site bleeding       Paresthesias: No.       Bolus given via needle..        Secured via.        Insertion/Infusion Method: Single Shot       Complications: none    Medication(s) Administered   Bupivacaine 0.25% PF (Infiltration) - Infiltration   20 mL - 1/29/2025 10:20:00 AM  Bupivacaine liposome (Exparel) 1.3% LA inj susp (Infiltration) - Infiltration   20 mL - 1/29/2025 10:20:00 AM  Medication Administration Time: 1/29/2025 10:20 AM      FOR [facility] ONLY:   Pain Team Contact information: please page the Pain Team Via  "[employer]. Search \"Pain\". During daytime hours, please page the attending first. At night please page the resident first.      "

## 2025-01-30 NOTE — PROGRESS NOTES
Courtesy rounds by CNM. Pt is feeling well. Was hoping for a vaginal delivery but is understanding of need for C/S. Reviewed the commonality of repeat C/S following TOLAC attempt. Reassured and applauded her efforts during labor. Answered questions re: dressing removal and follow-up care. CNM team to remain involved for continuity of care.

## 2025-01-30 NOTE — OP NOTE
[facility]  Full Operative Progress Note     Surgery Date:  2025    Surgeon:  Bronwyn Sims MD    Assistants:  Abbey Esquivel MD MPH - PGY-2      Pre-op Diagnosis:    - Intrauterine pregnancy at 41w1d  - Arrest of dilation   - Failed TOLAC  - History of one prior  section   - Gestational hypertension       Post-op Diagnosis:    - Same   - Vigorous male infant   - Post partum hemorrhage     Procedure: Repeat low-transverse  section with single layer uterine closure via pfannenstiel incision    Anesthesia: Epidural  QBL: 1462 mL  UOP: 200 mL clear urine  Fluids: 1700 mL crystalloid  Additional Medications: 2g Ancef preop, azithromycin, hemabate, rectal misoprostol   Specimens: Cord blood, cord segment  Drains: Fair   Complications: None apparent    Indications:   Deisi Carl is a 29 year old  at 41w1d admitted for induction of labor as a TOLAC with the CNM team. She had misoprostol and a cook for cervical ripening. She was augmented with pitocin and AROM for meconium stained fluid. Baby remained at a high station despite position changes. OB team was called for evaluation. She met criteria for arrest of active phase of labor at 6cm and AROMed for over six hours. A  section was recommended. The risks, benefits, and alternatives of  section were discussed with the patient including bleeding, infection, injury to surrounding structures (nerves, blood vessels, uterus, cervix, tubes, ovaries, bladder, bowel, rarely baby), and she agreed to proceed. Written consent obtained for proceeding with  section and for blood transfusion in the setting of hemorrhage or acute blood loss anemia.    Findings:   - Viable male infant delivered at 2130 on 2025  with APGARs 8 and 9. Weight 4230g.   - Cord gasses: Not obtained  - Meconium amniotic fluid, Placenta posterior.   - Normal uterus, tubes, and ovaries.   - Mild recto-fascial  adhesions. No intrabdominal adhesions.   - Surgical sites noted to be hemostatic at the end of case.   - Postpartum hemorrhage due to uterine atony, improved after medications and closure of hysterotomy.    Procedure Details:   The patient was brought to the OR, where adequate epidural anesthesia was bolused.  She was placed in the dorsal supine position with a slight leftward tilt. FHT prior to draping was reactive. She was prepped and draped in the usual sterile fashion. A surgical time out was performed. A  incision was made with the scalpel along her prior pfannenstiel skin incision and carried down to the underlying fascia with sharp and blunt dissection. The fascia was incised in the midline, and the incision was extended laterally with the Sheth scissors. The superior aspect of the fascia was grasped with the Kocher clamps and dissected off of the underlying rectus muscles with blunt and sharp dissection.     The rectus muscles were  in the midline, and the peritoneum was entered bluntly, and the opening was extended with digital pressure and electrosurgery. An Micah retractor was placed. A transverse hysterotomy was made with the scalpel in the lower uterine segment, and the incision was extended with digital pressure. Copious meconium fluid was expressed.  The infant was noted to be in the ALEKSANDRA position, and was delivered atraumatically. The shoulders delivered easily. The cord was doubly clamped and cut after 60 seconds, and the infant was handed off to the awaiting NICU staff. A segment of cord was cut and collected. The placenta was delivered with gentle traction on the umbilical cord and uterine massage. The uterus was not exteriorized and cleared of all clots and debris. Uterine tone was noted to be inadequate with pitocin given through the running IV and uterine massage.  Hemabate was administered. The hysterotomy was closed with a running unlocked suture of 0 Vicryl. The hysterotomy was noted  to be hemostatic. Tone was noted to be improved but still inadequate. Rectal misoprostol was placed by OR staff.     The pericolic gutters were cleared of all clots and debris. The hysterotomy was reexamined and noted to be hemostatic and tone was improved. The fascia and rectus muscles were examined and areas of oozing were controlled with electrocautery. The fascia was closed with a running 0 Vicryl suture. The subcutaneous tissue was irrigated and areas of oozing were controlled with electrocautery. The subcutaneous tissue was less than 2 cm in thickness, and was therefore not closed. The skin was closed with 4-0 Monocryl and covered with steri strips and overlying sterile dressing.    All sponge, needle, and instrument counts were correct. The patient tolerated the procedure well, and was transferred to recovery in stable condition.     Dr. Sims  was present and scrubbed for the procedure.     Abbey Esquivel MD MPH  Obstetric & Gynecology, PGY-2  January 29, 2025 , 11:04 PM     I participated in all aspects of Deisi Carl's case with Dr. Esquivel on 1/29/2025 and agree with the operative details in this note with edits by me.     Bronwyn Sims MD

## 2025-01-30 NOTE — DISCHARGE SUMMARY
[facility] Discharge Summary    Patient: Deisi Carl    YOB: 1995   MRN# [id number]           Date of Admission:  2025  Date of Discharge::  ***  Admitting Physician:  Geno Estrada, [facility]  Discharge Physician:  ***             Admission Diagnoses:   - Intrauterine pregnancy at 41w1d  - History of one prior  section   - TOLAC           Discharge Diagnosis:   - Same, delivered   - Arrest of dilation   - Failed TOLAC   - Gestational hypertension          Procedures:     Procedure(s): Repeat low-transverse  section with single layer uterine closure via pfannenstiel incision   Epidural   TAP block            Medications Prior to Admission:     Medications Prior to Admission   Medication Sig Dispense Refill Last Dose/Taking    ferrous gluconate (FERGON) 324 (38 Fe) MG tablet Take 324 mg by mouth daily (with breakfast).   2025 Morning    Prenatal Vit-Fe Fumarate-FA (PRENATAL MULTIVITAMIN  PLUS IRON) 27-1 MG TABS Take 1 tablet by mouth daily.   2025        Discharge Medications:        Review of your medicines        UNREVIEWED medicines. Ask your doctor about these medicines        Dose / Directions   ferrous gluconate 324 (38 Fe) MG tablet  Commonly known as: FERGON      Dose: 324 mg  Take 324 mg by mouth daily (with breakfast).  Refills: 0     prenatal multivitamin  plus iron 27-1 MG Tabs      Dose: 1 tablet  Take 1 tablet by mouth daily.  Refills: 0               Consultations:   - Anesthesia   - Lactation   - OBGYN   ***     Brief Admission and intrapartum History:   Ms. Deisi Carl is a 29 year old now P2 who initially presented at 41w1d for induction of labor as a TOLAC with the CN team. She had misoprostol and a cook for cervical ripening. She was augmented with pitocin and AROM for meconium stained fluid. Baby was OP on admission and remained OP with high station despite position  changes. OB team was called for evaluation. She met criteria for arrest of dilation at 6cm and AROMed for over six hours. A  section was recommended. The risks, benefits, and alternatives of  section were discussed. Written consent obtained for proceeding with  section and for blood transfusion in the setting of hemorrhage or acute blood loss anemia.        Intraoperative course   The procedure was complicated by post partum hemorrhage.  EBL 1462 mL.  See operative report for details.        Postpartum Course   The patient's hospital course was unremarkable.  She recovered as anticipated and experienced no post-operative complications. *** On discharge, her pain was well controlled.*** Vaginal bleeding is similar to peak menstrual flow.  Voiding without difficulty.  Ambulating well and tolerating a normal diet.  No fever or significant wound drainage.  Breastfeeding*** well.  Infant is stable.  No bowel movement yet.***  She was discharged on post-partum day #3***.    Post-partum hemoglobin:   Hemoglobin   Date Value Ref Range Status   2025 11.0 (L) 11.7 - 15.7 g/dL Final      Her Rh status was positive, and Rhogam was not indicated.   Contraception: ***           Discharge Instructions and Follow-Up:     Discharge diet: Regular   Discharge activity: No lifting greater than 20 lbs, pushing, pulling, or other strenuous activity for 6 weeks. Pelvic rest for 6 weeks including no sexual intercourse, tampons, or douching. No driving until you can slam on the breaks without pain or while on narcotic pain medications.    Discharge follow-up: Follow up with primary OB for incision check in 2 weeks, and routine postpartum visit in 6 weeks.  ***   Wound care: Keep incision clean and dry           Discharge Disposition:     Discharged to home        ***sign/date/refresh       hemorrhage.  EBL 1462 mL.      Findings include:   - Viable male infant delivered at 2130 on January 29, 2025  with APGARs 8 and 9. Weight 4230g.   - Cord gasses: Not obtained  - Meconium amniotic fluid, Placenta posterior.   - Normal uterus, tubes, and ovaries.   - Mild recto-fascial adhesions. No intrabdominal adhesions.   - Surgical sites noted to be hemostatic at the end of case.   - Postpartum hemorrhage due to uterine atony, improved after medications and closure of hysterotomy.    See operative report for additional details.        Postpartum Course   The patient's hospital course was unremarkable.  She recovered as anticipated and experienced no post-operative complications. She received a unit of PRBC for acute blood loss anemia and postpartum hemorrhage.  On discharge, her pain was well controlled. Vaginal bleeding is similar to peak menstrual flow.  Voiding without difficulty.  Ambulating well and tolerating a normal diet.  No fever or significant wound drainage.  Breastfeeding well.  Infant is stable.  She was discharged on post-partum day #2.    Post-partum hemoglobin:   Hemoglobin   Date Value Ref Range Status   01/30/2025 8.6 (L) 11.7 - 15.7 g/dL Final      Her Rh status was positive, and Rhogam was not indicated.   Contraception: Nexplanon s/p placement prior to discharge            Discharge Instructions and Follow-Up:     Discharge diet: Regular   Discharge activity: No lifting greater than 20 lbs, pushing, pulling, or other strenuous activity for 6 weeks. Pelvic rest for 6 weeks including no sexual intercourse, tampons, or douching. No driving until you can slam on the breaks without pain or while on narcotic pain medications.    Discharge follow-up: Follow up with primary OB in 2 weeks, and routine postpartum visit in 6 weeks.   Wound care: Keep incision clean and dry           Discharge Disposition:     Discharged to home      Alena Benjamin DO, PGY-2  [facility]   January 31, 2025  11:38 AM

## 2025-01-30 NOTE — PLAN OF CARE
Goal Outcome Evaluation:  OR to PACU Transfer Note  Data: Pt to OB PACU at 2240 via cart. PIV infusing NS with pitocin; now second bag is running. Pt with, roman with clear urine to gravity, pt does not complain of pain and/or nausea. TAP block done in OR. QBL 1462 ml (Dr Sims notified).   Interventions: IV to pump, monitors and alarms on, warm blankets, SCD on. Cares explained. Questions were answered  Response: stable.  Marvin and  Wilber at bedside.   Plan: Patient instructed to notify RN for pain or nausea, routine post op cares. Prepare Brandee if pt has moderate bleeding; please weight Blood loss. Report given to  Marcellus BARTON RN

## 2025-01-30 NOTE — BRIEF OP NOTE
[facility]  Brief Operative Note    Name: Deisi Carl  MRN: [id number]  : 1995  Date of Surgery: 2025     Pre-operative Diagnosis:  - IUP @ 41w1d   - Arrest of dilation   - Failed TOLAC  - History of one prior  section   - Gestational hypertension     Post-operative Diagnosis:  - Postpartum s/p delivery of viable male infant  - Post partum hemorrhage    Procedure(s):   - Repeat low transverse  section with single layer uterine closure via Pfannenstiel skin incision    Surgeon:  - Bronwyn Sims MD    Assistants:  - Abbey Esquivel MD MPH - PGY-2     Anesthesia: Epidural  QBL: 1182 mL  UOP: 200 mL clear urine  Fluids: 1700 mL crystalloid  Additional Medications: 2g Ancef preop, azithromycin, hemabate, rectal misoprostol   Specimens: Cord blood, cord segment  Drains: Fair   Complications: None apparent    Findings:   - Viable male infant delivered at 2130 on 2025  with APGARs 8 and 9. Weight 4230g.   - Cord gasses: No.   - Meconium amniotic fluid, Placenta posterior.   - Normal uterus, tubes, and ovaries.   - Mild recto-fascia adhesions.   - Surgical sites noted to be hemostatic at the end of case.     Abbey Eqsuivel MD MPH  Obstetric & Gynecology, PGY-2  2025 , 10:25 PM

## 2025-01-30 NOTE — PLAN OF CARE
VSS. Postpartum assessment WDL. Fundus firm, midline, at umbilicus. Bleeding light and rubra. Denies any pre-E symptoms. Reflexes +1, no clonus. Fair removed at 5am. 2/10 pain. Tylenol and Toradol given. Ambulating standby assist. Bonding well with baby in room. Breastfeeding and/or formula feeding every 2-3 hours.

## 2025-01-30 NOTE — ANESTHESIA POSTPROCEDURE EVALUATION
Patient: Deisi Jeter [place]    Procedure: Procedure(s):   section       Anesthesia Type:  Epidural    Note:  Disposition: Inpatient   Postop Pain Control: Uneventful            Sign Out: Well controlled pain   PONV: No   Neuro/Psych: Uneventful            Sign Out: Acceptable/Baseline neuro status   Airway/Respiratory: Uneventful            Sign Out: Acceptable/Baseline resp. status   CV/Hemodynamics: Uneventful            Sign Out: Acceptable CV status; No obvious hypovolemia; No obvious fluid overload   Other NRE: NONE   DID A NON-ROUTINE EVENT OCCUR? No     Epidural-to- Updated ASA: 2    Last vitals:  Vitals Value Taken Time   /78 25 0015   Temp 37.2  C (98.9  F) 25 0010   Pulse 92 25 0018   Resp 17 25 0018   SpO2 96 % 25 0021   Vitals shown include unfiled device data.    Electronically Signed By: Sarah Kirby MD  2025  12:55 AM

## 2025-01-31 ENCOUNTER — APPOINTMENT (OUTPATIENT)
Dept: INTERPRETER SERVICES | Facility: CLINIC | Age: 30
End: 2025-01-31
Payer: COMMERCIAL

## 2025-01-31 VITALS
BODY MASS INDEX: 27.8 KG/M2 | OXYGEN SATURATION: 97 % | TEMPERATURE: 98 F | SYSTOLIC BLOOD PRESSURE: 129 MMHG | DIASTOLIC BLOOD PRESSURE: 73 MMHG | HEIGHT: 61 IN | HEART RATE: 74 BPM | RESPIRATION RATE: 16 BRPM | WEIGHT: 147.27 LBS

## 2025-01-31 PROCEDURE — 250N000009 HC RX 250

## 2025-01-31 PROCEDURE — 250N000013 HC RX MED GY IP 250 OP 250 PS 637

## 2025-01-31 PROCEDURE — 99238 HOSP IP/OBS DSCHRG MGMT 30/<: CPT | Mod: GC | Performed by: OBSTETRICS & GYNECOLOGY

## 2025-01-31 PROCEDURE — 0JHF3HZ INSERTION OF CONTRACEPTIVE DEVICE INTO LEFT UPPER ARM SUBCUTANEOUS TISSUE AND FASCIA, PERCUTANEOUS APPROACH: ICD-10-PCS | Performed by: OBSTETRICS & GYNECOLOGY

## 2025-01-31 RX ORDER — AMOXICILLIN 250 MG
1 CAPSULE ORAL 2 TIMES DAILY
Qty: 100 TABLET | Refills: 0 | Status: SHIPPED | OUTPATIENT
Start: 2025-01-31

## 2025-01-31 RX ORDER — IBUPROFEN 800 MG/1
800 TABLET, FILM COATED ORAL EVERY 6 HOURS PRN
Qty: 40 TABLET | Refills: 0 | Status: SHIPPED | OUTPATIENT
Start: 2025-01-31

## 2025-01-31 RX ORDER — OXYCODONE HYDROCHLORIDE 5 MG/1
5 TABLET ORAL EVERY 4 HOURS PRN
Qty: 8 TABLET | Refills: 0 | Status: SHIPPED | OUTPATIENT
Start: 2025-01-31

## 2025-01-31 RX ORDER — LIDOCAINE HYDROCHLORIDE 10 MG/ML
INJECTION, SOLUTION EPIDURAL; INFILTRATION; INTRACAUDAL; PERINEURAL
Status: DISCONTINUED
Start: 2025-01-31 | End: 2025-01-31 | Stop reason: HOSPADM

## 2025-01-31 RX ORDER — LIDOCAINE HYDROCHLORIDE 10 MG/ML
5 INJECTION, SOLUTION EPIDURAL; INFILTRATION; INTRACAUDAL; PERINEURAL
Status: COMPLETED | OUTPATIENT
Start: 2025-01-31 | End: 2025-01-31

## 2025-01-31 RX ORDER — ACETAMINOPHEN 325 MG/1
975 TABLET ORAL EVERY 6 HOURS PRN
Qty: 100 TABLET | Refills: 0 | Status: SHIPPED | OUTPATIENT
Start: 2025-01-31

## 2025-01-31 RX ADMIN — ACETAMINOPHEN 975 MG: 325 TABLET, FILM COATED ORAL at 05:39

## 2025-01-31 RX ADMIN — IBUPROFEN 800 MG: 800 TABLET, FILM COATED ORAL at 05:39

## 2025-01-31 RX ADMIN — LIDOCAINE HYDROCHLORIDE 5 ML: 10 INJECTION, SOLUTION EPIDURAL; INFILTRATION; INTRACAUDAL; PERINEURAL at 11:55

## 2025-01-31 RX ADMIN — IBUPROFEN 800 MG: 800 TABLET, FILM COATED ORAL at 11:46

## 2025-01-31 RX ADMIN — SENNOSIDES AND DOCUSATE SODIUM 2 TABLET: 50; 8.6 TABLET ORAL at 08:43

## 2025-01-31 RX ADMIN — ACETAMINOPHEN 975 MG: 325 TABLET, FILM COATED ORAL at 00:12

## 2025-01-31 RX ADMIN — ACETAMINOPHEN 975 MG: 325 TABLET, FILM COATED ORAL at 11:46

## 2025-01-31 RX ADMIN — IBUPROFEN 800 MG: 800 TABLET, FILM COATED ORAL at 00:12

## 2025-01-31 ASSESSMENT — ACTIVITIES OF DAILY LIVING (ADL)
ADLS_ACUITY_SCORE: 24
ADLS_ACUITY_SCORE: 27
ADLS_ACUITY_SCORE: 24
ADLS_ACUITY_SCORE: 24
ADLS_ACUITY_SCORE: 27
ADLS_ACUITY_SCORE: 24
ADLS_ACUITY_SCORE: 27
ADLS_ACUITY_SCORE: 24
ADLS_ACUITY_SCORE: 27
ADLS_ACUITY_SCORE: 27
ADLS_ACUITY_SCORE: 24
ADLS_ACUITY_SCORE: 24
ADLS_ACUITY_SCORE: 27
ADLS_ACUITY_SCORE: 24
ADLS_ACUITY_SCORE: 24
ADLS_ACUITY_SCORE: 27

## 2025-01-31 NOTE — DISCHARGE INSTRUCTIONS
Warning Signs after Having a Baby    Keep this paper on your fridge or somewhere else where you can see it.    Call your provider if you have any of these symptoms up to 12 weeks after having your baby.    Thoughts of hurting yourself or your baby  Pain in your chest or trouble breathing  Severe headache not helped by pain medicine  Eyesight concerns (blurry vision, seeing spots or flashes of light, other changes to eyesight)  Fainting, shaking or other signs of a seizure    Call 9-1-1 if you feel that it is an emergency.     The symptoms below can happen to anyone after giving birth. They can be very serious. Call your provider if you have any of these warning signs.    My provider s phone number: _______________________    Losing too much blood (hemorrhage)    Call your provider if you soak through a pad in less than an hour or pass blood clots bigger than a golf ball. These may be signs that you are bleeding too much.    Blood clots in the legs or lungs    After you give birth, your body naturally clots its blood to help prevent blood loss. Sometimes this increased clotting can happen in other areas of the body, like the legs or lungs. This can block your blood flow and be very dangerous.     Call your provider if you:  Have a red, swollen spot on the back of your leg that is warm or painful when you touch it.   Are coughing up blood.     Infection    Call your provider if you have any of these symptoms:  Fever of 100.4 F (38 C) or higher.  Pain or redness around your stitches if you had an incision.   Any yellow, white, or green fluid coming from places where you had stitches or surgery.    Mood Problems (postpartum depression)    Many people feel sad or have mood changes after having a baby. But for some people, these mood swings are worse.     Call your provider right away if you feel so anxious or nervous that you can't care for yourself or your baby.    Preeclampsia (high blood pressure)    Even if you  didn't have high blood pressure when you were pregnant, you are at risk for the high blood pressure disease called preeclampsia. This risk can last up to 12 weeks after giving birth.     Call your provider if you have:   Pain on your right side under your rib cage  Sudden swelling in the hands and face    Remember: You know your body. If something doesn't feel right, get medical help.     For informational purposes only. Not to replace the advice of your health care provider. Copyright 2020 [facility]. All rights reserved. Clinically reviewed by Lilliana Lemus, RNC-OB, MSN. MD [employer] [id number] - Rev 02/23.

## 2025-01-31 NOTE — LACTATION NOTE
This note was copied from a baby's chart.  Consult for:  RN request, pt choosing to breast and bottle feed baby    Infant Name: Matt    Infant's Primary Care Clinic: [facility]    Delivery Information:  Matt was born at Gestational Age: 41w1d via   delivery on 2025 9:30 PM     Maternal Health History:    Information for the patient's mother:  Deisi Torrez [[id number]]   History reviewed. No pertinent past medical history. and   Information for the patient's mother:  Deisi Torrez [[id number]]     Medications Prior to Admission   Medication Sig Dispense Refill Last Dose/Taking    ferrous gluconate (FERGON) 324 (38 Fe) MG tablet Take 324 mg by mouth daily (with breakfast).   2025 Morning    Prenatal Vit-Fe Fumarate-FA (PRENATAL MULTIVITAMIN  PLUS IRON) 27-1 MG TABS Take 1 tablet by mouth daily.   2025         Maternal Breast Exam:  Deisi noted breast growth and sensitivity in early pregnancy. She denies any history of breast/chest injury or surgery. Her breasts are soft and symmetrical with bilateral intact, everted nipples. She has been able to hand express colostrum. ?    Breastfeeding/ Lactation History:  old child 10 years ago, no problems, did not use any formula.    Deisi plans to breast and bottle feed due to the necessity of returning to work and the anticipation that pumping while working will be difficult so Matt will need to be taking in bottles of formula.    Infant information: Matt was LGA at birth and has age appropriate output and weight loss.      Weight Change Since Birth: -4% at 2 day old     Oral exam of baby:  Matt has normal jaw, normal palate, good length of tongue beyond lingual frenulum attachment, extension well beyond gum ridge & organized when sucking on finger.     Feeding History: Formula feeding via bottle and attempting breastfeeding prior to giving a bottle.  Matt has had difficulties stayed latched as he appears to get frustrated  with slow flow at the breast.  He has been getting ~30mls of formula via bottle.    Feeding Assessment:  Matt was actively rooting and eager to latch and stayed for about 20 seconds before falling asleep.  He was repositioned to [facility] from cradle and he was able to latch, but quickly got frustrated.  Milk was expressed to the nipple but he was inconsolable.  After a few minutes of attempting, he was moved to the right breast where he was again not able to attain a sustained latch due to frustration.      We discussed that the breast should be offered first and then a bottle can be given following the latch attempt.  The importance of breast stimulation to increase milk production was discussed.      Education:   [] Expected  feeding patterns in the first few days (pg. 38 of Your Guide to To Postpartum and [place] [facility])/ the Second Night  [x] Stages of milk production  [] Benefits of hand expression of colostrum  [] Early feeding cues     [] Benefits of feeding on cue  [] Benefits of skin to skin  [x] Breastfeeding positions  [x] Tips to get and maintain a deep latch  [] Nutritive vs.non-nutritive sucking  [x] Gentle breast compressions as needed to enhance milk transfer  [] How to tell when baby is finished  [] How to tell if baby is getting enough  [] Expected  output  []  weight loss  [] Infant Feeding Log  [] [employer] Breastfeeding/Pumping videos  [x] Signs breastfeeding is going well (comfortable latch, audible swallows, age appropriate output and weight loss)    [] Tips to prevent engorgement  [] Signs of engorgement  [] Tips to manage engorgement  [x] Pumping recommendations (based on patient need)  [] [facility] breast pump part/infant feeding supplies cleaning recommendations  [x] Inpatient breastfeeding support  [] Outpatient lactation resources    Handouts: Paced Bottle Feeding ([facility])    Home Breast Pump: They have a pump at home already.    Plan: Continue breastfeeding on cue with  RN support as needed, goal of 8-12 feedings per day.     Encourage frequent skin to skin and hand expression.       Jenn Juan, RN, IBCLC   Lactation Consultant  [place]ph[place]: [facility] [phone]  Office: [phone]

## 2025-01-31 NOTE — PROCEDURES
Procedure Note: Nexplanon insertion   Preoperative Diagnosis: Desires long-acting reversible contraception     Postoperative Diagnosis: Desired long-acting reversible contraception, s/p Nexplanon placement     Consent: Risks, benefits of treatment, and treatment were discussed. Patient's questions were elicited and answered. Written consent signed and scanned into medical record. Patient received and verbalized understanding of discharge instructions    Skin Preparation: Betadine     Anesthesia: 3cc 1% lidocaine plain    Technique: Patient was consented for Nexplanon placement. The patient's left arm was flexed and externally rotated with her wrist parallel to her ear. The patient's previous Nexplanon incision was used for placement. The 3cc of 1% lidocaine without epinephrine were then injected along the line of insertion of the Nexplanon. The area was cleaned with betadine swabs x3. The tip of the Nexplanon applicator was then inserted at a 20 degree angle into the nick at the insertion domingo and the needle was advanced the full length keeping the skin tented during insertion. The applicator seal was broken and the cannula was then retracted. Pressure was held at the insertion domingo. A steri-strip was placed over the incision. A band-aid was placed over the steri-strip. A pressure bandage was then placed using Kerlix gauze. The patient tolerated the procedure very well.   EBL: <5 mL   Complications: None   Nexplanon Lot#: [id number]  Exp: 02/2027  Dr. Herron was present for the procedure.     Alena Benjamin DO, PGY-2  [facility]   January 31, 2025 11:31 AM

## 2025-01-31 NOTE — PLAN OF CARE
Problem: Postpartum ( Delivery)  Goal: Successful Parent Role Transition  Outcome: Progressing  Intervention: Support Parent Role Transition  Recent Flowsheet Documentation  Taken 2025 0850 by Michelle Hardin RN  Supportive Measures:   active listening utilized   self-care encouraged   self-responsibility promoted  Parent-Child Attachment Promotion:   caring behavior modeled   cue recognition promoted   interaction encouraged   face-to-face positioning promoted   parent/caregiver presence encouraged   participation in care promoted   positive reinforcement provided   rooming-in promoted   skin-to-skin contact encouraged   strengths emphasized  Goal: Hemostasis  Outcome: Progressing  Goal: Optimal Pain Control and Function  Outcome: Progressing     Problem: Postpartum ( Delivery)  Goal: Effective Urinary Elimination  Outcome: Met     Assessment complete and WDL. VSS. Pt up ad casey, voiding WDL. Pt taking tylenol and toradol for pain. Pt breastfeeding well and encouraged to start pumping if she supplements infant with formula. Pt not feeling ready to pump this shift. Pt received 1 unit of PRBC this shift due to hemoglobin of 7.0 after delivery. Pt will hemoglobin rechecked this evening at 2000. Meals ordered for pt this shift.     Pt denies headache pain, RUQ pain or vision changes. Reflexes WDL, no clonus present. BP all WDL this shift.     Continue POC.

## 2025-01-31 NOTE — PLAN OF CARE
Vital signs within normal limits. Postpartum checks within normal limits - see flow record. Patient eating and drinking normally. Patient able to empty bladder independently and is up ambulating. No apparent signs of infection.  Patient performing self cares and is able to care for infant.  Patient medicated during the shift for pain. See MAR. Patient reassessed after each medication and pain was improved -     Problem: Adult Inpatient Plan of Care  Goal: Optimal Comfort and Wellbeing  Intervention: Provide Person-Centered Care  Recent Flowsheet Documentation  Taken 2025 by Deisi Barnes RN  Trust Relationship/Rapport:   care explained   choices provided  T  Problem: Postpartum ( Delivery)  Goal: Successful Parent Role Transition  Intervention: Support Parent Role Transition  Recent Flowsheet Documentation  Taken 2025 by Deisi Barnes RN  Supportive Measures:   active listening utilized   positive reinforcement provided

## 2025-01-31 NOTE — PLAN OF CARE
Data: Vital signs  and postpartum checks within normal limits - see flow record. Patient eating and drinking normally. Patient able to empty bladder independently and is up ambulating in the room. Pt is breastfeeding and formula feeding baby with 25-30  ml. Incision is in tact and no apparent signs of infection noted.. pt denies pain. Patient performing self cares and is able to care for infant.  Action: Patient medicated during the shift for pain control. Checked latch. Education on formula feeding and use of pacifier done with parents.  Reviewed teaching and discharge instructions with pt using the . Checked ID. Pt was given discharge medications including that of baby with instructions and copies of the discharge papers. Response: Positive attachment behaviors observed with infant. Support persons  present.   Plan: pt discharged home today with baby.

## 2025-01-31 NOTE — PROGRESS NOTES
Obstetrics Postpartum Progress Note  DOS: 25  MRN: [id number]    POD#2 after RLTCS     Due to language barrier, a  was present during the history-taking and subsequent discussion (and for part of the physical exam) with this patient. ***    S: Patient ***.  Pain is well controlled***. Voiding spontaneously. Tolerating regular diet without nausea or vomiting***.  Ambulating without without any issues.  Lochia is within expected limits.  Passing flatus. Planning on breast feeding and formula feeding as needed.  She denies headaches, vision changes, chest pain, SOB, or other systemic symptoms.  O:  Vitals:    25 2103 25 2104 25 2105 25 2106   BP:       BP Location:       Patient Position:       Cuff Size:       Pulse:       Resp:       Temp:       TempSrc:       SpO2: 95% 99% 99% 99%   Weight:       Height:         Gen:  NAD, alert and oriented  CV: Regular rate, well perfused  Resp: Nonlabored on room air, normal inspiratory effort  Abd: soft, nondistended, non-tender, fundus firm at 1 cm below the umbilicus  Incision: Bandage in place with no saturation***; no surrounding erythema or drainage  Ext: 1+ edema bilaterally; SCDs in place    Vitals:    25 0752 25 1723   Weight: 71.7 kg (158 lb) 68.5 kg (151 lb)     Labs:  Hemoglobin   Date Value Ref Range Status   2025 8.6 (L) 11.7 - 15.7 g/dL Final   2025 7.0 (L) 11.7 - 15.7 g/dL Final       Rubella Antibody IgG   Date Value Ref Range Status   2024 Positive  Final     Comment:     Suggests previous exposure or immunization and probable immunity.       Assessment and Plan: 29 year old  POD#2 s/p RLTCS, doing well postpartum. Pregnancy was notable for unsuccessful TOLAC and meeting criteria for gestational hypertension. She is doing well and meeting milestones*** for discharge to home.     gHTN  - HELLP wnl on 2025  - No signs/symptoms of preeclampsia on review of systems or physical  examination    Routine postpartum  Postpartum hemorrhage, ABLA  Heme: Hgb 11 > EBL 1462 > 7.4>7> 1u pRBCs> 8.6. No symptoms of ABLA***. Will discharge home w/ PO iron.  Pain: well-controlled with tylenol, ibuprofen and oxycodone prn, continue.  Rh +/Rubella immune. No intervention required.  Feeding: Formula feeding and working on breast feeding  :  s/p roman, voiding spontaneously  PPX: Encourage ambulation  Continue regular diet, scheduled bowel regimen, prn antiemetics  Contraception: Did not address this morning***; will discuss prior to discharge.      Dispo: Anticipate discharge home POD#2-3    ***refresh, update dates, cosign

## 2025-01-31 NOTE — PROGRESS NOTES
"  Anesthesia Post-Partum Follow-Up Note After  Delivery with Epidural    Patient: Deisi Carl    Patient location: Post-partum floor    Anesthesia type: Epidural    Subjective  Deisi Carl does not complain of pruritis at this time. She denies weakness, denies paresthesia, denies difficulties breathing or voiding, denies nausea or vomiting, and denies headache. She is able to ambulate and tolerates regular diet.     Objective  Respiratory Function (RR / SpO2 / Airway Patency): Satisfactory  Cardiac Function (HR / Rhythm / BP): Satisfactory  Strength and sensation lower extremities: Normal  Epidual site: No signs of infection or inflammation    Most recent vitals  /75 (BP Location: Left arm)   Pulse 78   Temp 36.6  C (97.8  F) (Oral)   Resp 16   Ht 1.55 m (5' 1.02\")   Wt 68.5 kg (151 lb)   LMP 2024   SpO2 97%   Breastfeeding Unknown   BMI 28.51 kg/m      Assessment and plan  Deisi Carl is a 29 year old female  post partum day #2  s/p  delivery with epidural and TAP block for post-operative pain control.    At this time, there is no evidence of adverse side effects associated with anesthetic procedures performed. We encourage the continued use of multimodal analgesic therapy for adequate pain management over the next several days, and if any questions arise regarding anesthetic care, please reach out to the obstetric anesthesiology team.     Thank you for including us in the care of this patient..    Wiley Lake MD  Anesthesiology PGY-3  25    "

## 2025-02-01 ENCOUNTER — MEDICAL CORRESPONDENCE (OUTPATIENT)
Dept: HEALTH INFORMATION MANAGEMENT | Facility: CLINIC | Age: 30
End: 2025-02-01

## 2025-03-12 ENCOUNTER — LAB REQUISITION (OUTPATIENT)
Dept: LAB | Facility: CLINIC | Age: 30
End: 2025-03-12
Payer: COMMERCIAL

## 2025-03-12 DIAGNOSIS — Z12.4 ENCOUNTER FOR SCREENING FOR MALIGNANT NEOPLASM OF CERVIX: ICD-10-CM

## 2025-03-12 PROCEDURE — G0145 SCR C/V CYTO,THINLAYER,RESCR: HCPCS | Mod: ORL | Performed by: MIDWIFE

## 2025-03-12 PROCEDURE — 87624 HPV HI-RISK TYP POOLED RSLT: CPT | Mod: ORL | Performed by: MIDWIFE

## 2025-03-17 LAB
BKR LAB AP GYN ADEQUACY: NORMAL
BKR LAB AP GYN INTERPRETATION: NORMAL
BKR LAB AP HPV REFLEX: NORMAL
BKR LAB AP LMP: NORMAL
BKR LAB AP PREVIOUS ABNORMAL: NORMAL
PATH REPORT.COMMENTS IMP SPEC: NORMAL
PATH REPORT.COMMENTS IMP SPEC: NORMAL
PATH REPORT.RELEVANT HX SPEC: NORMAL

## 2025-03-20 ENCOUNTER — LAB REQUISITION (OUTPATIENT)
Dept: LAB | Facility: CLINIC | Age: 30
End: 2025-03-20
Payer: COMMERCIAL

## 2025-03-20 DIAGNOSIS — Z12.4 ENCOUNTER FOR SCREENING FOR MALIGNANT NEOPLASM OF CERVIX: ICD-10-CM

## 2025-03-26 LAB
HPV HR 12 DNA CVX QL NAA+PROBE: NEGATIVE
HPV16 DNA CVX QL NAA+PROBE: NEGATIVE
HPV18 DNA CVX QL NAA+PROBE: NEGATIVE
HUMAN PAPILLOMA VIRUS FINAL DIAGNOSIS: NORMAL

## (undated) DEVICE — SOL NACL 0.9% IRRIG 1000ML BOTTLE 07138-09

## (undated) DEVICE — GLOVE PROTEXIS BLUE W/NEU-THERA 6.5  2D73EB65

## (undated) DEVICE — SOL WATER IRRIG 1000ML BOTTLE 07139-09

## (undated) DEVICE — DRSG STERI STRIP 1/2X4" R1547

## (undated) DEVICE — CATH TRAY FOLEY 16FR BARDEX W/DRAIN BAG STATLOCK 300316A

## (undated) DEVICE — ESU PENCIL W/SMOKE EVAC NEPTUNE STRYKER 0703-046-000

## (undated) DEVICE — STOCKING SLEEVE COMPRESSION CALF LG

## (undated) DEVICE — SU MONOCRYL 0 CT-1 36" Y346H

## (undated) DEVICE — PACK C-SECTION LF PL15OTA83B

## (undated) DEVICE — PREP CHLORAPREP 26ML TINTED ORANGE  260815

## (undated) DEVICE — SU MONOCRYL 4-0 PS-2 18" UND Y496G

## (undated) DEVICE — SU VICRYL 0 CT-1 36" J346H

## (undated) DEVICE — STRAP KNEE/BODY 31143004

## (undated) DEVICE — Device

## (undated) DEVICE — GLOVE ESTEEM POWDER FREE SMT 6.0  2D72PT60

## (undated) RX ORDER — FENTANYL CITRATE 50 UG/ML
INJECTION, SOLUTION INTRAMUSCULAR; INTRAVENOUS
Status: DISPENSED
Start: 2025-01-29

## (undated) RX ORDER — PHENYLEPHRINE HCL IN 0.9% NACL 50MG/250ML
PLASTIC BAG, INJECTION (ML) INTRAVENOUS
Status: DISPENSED
Start: 2025-01-29

## (undated) RX ORDER — OXYTOCIN/0.9 % SODIUM CHLORIDE 30/500 ML
PLASTIC BAG, INJECTION (ML) INTRAVENOUS
Status: DISPENSED
Start: 2025-01-29

## (undated) RX ORDER — ONDANSETRON 2 MG/ML
INJECTION INTRAMUSCULAR; INTRAVENOUS
Status: DISPENSED
Start: 2025-01-29